# Patient Record
Sex: MALE | Race: BLACK OR AFRICAN AMERICAN | Employment: OTHER | URBAN - METROPOLITAN AREA
[De-identification: names, ages, dates, MRNs, and addresses within clinical notes are randomized per-mention and may not be internally consistent; named-entity substitution may affect disease eponyms.]

---

## 2019-03-22 PROBLEM — R00.2 RAPID PALPITATIONS: Status: ACTIVE | Noted: 2019-03-22

## 2019-03-22 PROBLEM — C85.90 LYMPHOMA (HCC): Status: ACTIVE | Noted: 2019-03-22

## 2019-03-22 PROBLEM — I48.0 PAROXYSMAL A-FIB (HCC): Status: ACTIVE | Noted: 2019-03-22

## 2022-10-26 ENCOUNTER — OFFICE VISIT (OUTPATIENT)
Dept: CARDIOLOGY CLINIC | Age: 72
End: 2022-10-26
Payer: MEDICARE

## 2022-10-26 VITALS
HEART RATE: 62 BPM | WEIGHT: 214 LBS | RESPIRATION RATE: 16 BRPM | SYSTOLIC BLOOD PRESSURE: 140 MMHG | DIASTOLIC BLOOD PRESSURE: 80 MMHG | HEIGHT: 69 IN | BODY MASS INDEX: 31.7 KG/M2

## 2022-10-26 DIAGNOSIS — I38 VALVULAR HEART DISEASE: ICD-10-CM

## 2022-10-26 DIAGNOSIS — I45.10 RBBB (RIGHT BUNDLE BRANCH BLOCK): ICD-10-CM

## 2022-10-26 DIAGNOSIS — I10 PRIMARY HYPERTENSION: ICD-10-CM

## 2022-10-26 DIAGNOSIS — I48.0 PAROXYSMAL ATRIAL FIBRILLATION (HCC): Primary | ICD-10-CM

## 2022-10-26 PROCEDURE — G8427 DOCREV CUR MEDS BY ELIG CLIN: HCPCS | Performed by: INTERNAL MEDICINE

## 2022-10-26 PROCEDURE — 3078F DIAST BP <80 MM HG: CPT | Performed by: INTERNAL MEDICINE

## 2022-10-26 PROCEDURE — 3074F SYST BP LT 130 MM HG: CPT | Performed by: INTERNAL MEDICINE

## 2022-10-26 PROCEDURE — 1123F ACP DISCUSS/DSCN MKR DOCD: CPT | Performed by: INTERNAL MEDICINE

## 2022-10-26 PROCEDURE — 99214 OFFICE O/P EST MOD 30 MIN: CPT | Performed by: INTERNAL MEDICINE

## 2022-10-26 PROCEDURE — 1036F TOBACCO NON-USER: CPT | Performed by: INTERNAL MEDICINE

## 2022-10-26 PROCEDURE — 93000 ELECTROCARDIOGRAM COMPLETE: CPT | Performed by: INTERNAL MEDICINE

## 2022-10-26 PROCEDURE — 3017F COLORECTAL CA SCREEN DOC REV: CPT | Performed by: INTERNAL MEDICINE

## 2022-10-26 PROCEDURE — G8484 FLU IMMUNIZE NO ADMIN: HCPCS | Performed by: INTERNAL MEDICINE

## 2022-10-26 PROCEDURE — G8417 CALC BMI ABV UP PARAM F/U: HCPCS | Performed by: INTERNAL MEDICINE

## 2022-10-26 RX ORDER — GLIPIZIDE 5 MG/1
TABLET ORAL
COMMUNITY
Start: 2022-08-22

## 2022-10-26 NOTE — PROGRESS NOTES
Patient Active Problem List   Diagnosis    Chest pain    RBBB (right bundle branch block)    HTN (hypertension)    OA (osteoarthritis)    GERD (gastroesophageal reflux disease)    Valvular heart disease    Palpitations    Paroxysmal atrial fibrillation (HCC)    Lymphoma (HCC)       Current Outpatient Medications   Medication Sig Dispense Refill    flecainide (TAMBOCOR) 100 MG tablet TAKE 1 TABLET TWICE A  tablet 1    metoprolol succinate (TOPROL XL) 50 MG extended release tablet Take 1 tablet by mouth 2 times daily 180 tablet 3    ELDERBERRY PO Take by mouth daily      pantoprazole (PROTONIX) 40 MG tablet TAKE 1 TABLET BY MOUTH 1/2 HOUR BEFORE BREAKFAST AND SUPPER FOR 3 WEEKS THEN DECREASE TO ONCE A DAY      acetaminophen (TYLENOL) 325 MG tablet Take 650 mg by mouth every 6 hours as needed for Pain      apixaban (ELIQUIS) 5 MG TABS tablet Take 1 tablet by mouth 2 times daily 60 tablet 0    Fish Oil-Cholecalciferol (FISH OIL + D3) 3563-0737 MG-UNIT CAPS Take by mouth LD 12/23/2017      Multiple Vitamins-Minerals (CENTRUM SILVER PO) Take by mouth LD 12/23/2017      glipiZIDE (GLUCOTROL) 5 MG tablet TAKE 1 TABLET BY MOUTH EVERY DAY       No current facility-administered medications for this visit. CC:    Patient is seen in follow up for:  1. Paroxysmal atrial fibrillation (HCC)    2. Primary hypertension    3. Valvular heart disease    4. RBBB (right bundle branch block)        HPI:  Seen in preoperative evaluation prior to hand surgery with Dr. Abiola Cat. Patient is doing well without any specific cardiac problems. Patient denies any shortness of breath, chest pain, lightheadedness or dizziness. Patient is tolerating medications well without side effects.       ROS:   General: No unusual weight gain, no change in exercise tolerance  Skin: No rash or itching  EENT: No vision changes or nosebleeds  Cardiovascular: No orthopnea or paroxysmal nocturnal dyspnea  Respiratory: No cough or hemoptysis  Gastrointestinal: No hematemesis or recent changes in bowel habits  Genitourinary: No hematuria, urgency or frequency  Musculoskeletal: No muscular weakness or joint swelling   Neurologic / Psychiatric: No incoordination or convulsions  Allergic / Immunologic/ Lymphatic / Endocrine: No anemia or bleeding tendency    Social History     Socioeconomic History    Marital status:      Spouse name: Not on file    Number of children: Not on file    Years of education: Not on file    Highest education level: Not on file   Occupational History    Not on file   Tobacco Use    Smoking status: Never    Smokeless tobacco: Never   Vaping Use    Vaping Use: Never used   Substance and Sexual Activity    Alcohol use: No    Drug use: No    Sexual activity: Not on file   Other Topics Concern    Not on file   Social History Narrative    Not on file     Social Determinants of Health     Financial Resource Strain: Not on file   Food Insecurity: Not on file   Transportation Needs: Not on file   Physical Activity: Not on file   Stress: Not on file   Social Connections: Not on file   Intimate Partner Violence: Not on file   Housing Stability: Not on file       Family History   Problem Relation Age of Onset    Heart Disease Mother     Heart Failure Mother     Alzheimer's Disease Father        Past Medical History:   Diagnosis Date    Arthritis     SHOULDERS,let thumb    Cancer ([facility] [address]     prostate 12/2016    GERD (gastroesophageal reflux disease)     Hypertension     STABLE    Left carpal tunnel syndrome     Lymphoma ([facility] [address]     Seasonal allergies     Skin lesions, generalized MULTIPLE    ARMS, BACK, KNEE, healed, presently no open wounds 5/4/17       PHYSICAL EXAM:  CONSTITUTIONAL:  Well developed, well nourished    Vitals:    10/26/22 0832   BP: (!) 140/80   Pulse: 62   Resp: 16   Weight: 214 lb (97.1 kg)   Height: 5' 9\" (1.753 m)     HEAD & FACE: Normocephalic. Symmetric. EYES: No xanthelasma.   Conjunctivae not injected. EARS, NOSE, MOUTH & THROAT: Good dentition. No oral pallor or cyanosis. NECK: No JVD at 30 degrees. No thyromegaly. RESPIRATORY: Clear to auscultation and percussion in all fields. No use of accessory muscle or intercostal retractions. CARDIOVASCULAR: Regular rate and rhythm. No lifts or thrills on palpitation. Auscultation with normal S1-S2 in intensity and splitting. No carotid bruits. Abdominal aorta not enlarged. Femoral arteries without bruits. Pedal pulses 2+. No edema. ABDOMEN: Soft without hepatic or splenic enlargement. No tenderness. MUSCULOSKELETAL: No kyphosis or scoliosis of the back. Good muscle strength and tone. No muscle atrophy. Normal gait and ability to undergo exercise stress testing. EXTREMITIES: No clubbing or cyanosis. SKIN: No Xanthomas or ulcerations. NEUROLOGIC: Oriented to time, place and person. Normal mood and affect. LYMPHATIC:  No palpable neck or supraclavicular nodes. No splenomegaly. EKG: the EKG tracing was reviewed and found to reveal: Normal sinus rhythm. First-degree AV block. Right bundle branch block. QTc 441 msec. No change compared to prior tracing. ASSESSMENT:                                                     ORDERS:       Diagnosis Orders   1. Paroxysmal atrial fibrillation (HCC)  EKG 12 lead      2. Primary hypertension  EKG 12 lead      3. Valvular heart disease  EKG 12 lead      4. RBBB (right bundle branch block)  EKG 12 lead        Above assessment cardiac issues stable. PLAN:   See above orders. Medication reconciliation completed. Old records were reviewed and found to reveal: EF 53%. Discussed issues that would prompt earlier evaluation. Same cardiac medications. Letter of preop risk evaluation dictated to Dr. Renetta Freeman. Follow-up office visit in 2 years.

## 2022-12-19 ENCOUNTER — TELEPHONE (OUTPATIENT)
Dept: NON INVASIVE DIAGNOSTICS | Age: 72
End: 2022-12-19

## 2022-12-19 NOTE — TELEPHONE ENCOUNTER
Called and spoke to patient, he verbalizes that he feels fine other than having some sinus/cold symptoms currently. Discussed with him his most recent device transmission. States he was in the shower on 12/01/2022 around 1100 am, and felt dizzy for a brief period of time. Will forward to Dr Robert Urias.

## 2022-12-19 NOTE — TELEPHONE ENCOUNTER
Called patient with Dr Kamara Mems recommendations, decrease Toprol to 50 mg daily, advise no driving, and recommend pacemaker. Patient states he is at the bank, will call back.

## 2022-12-19 NOTE — TELEPHONE ENCOUNTER
----- Message from Sri Yeung MD sent at 12/19/2022  2:08 PM EST -----  Advised no driving. Advised to decrease Toprol XL from 50 mg bid to 50 mg daily. Please schedule for pacemaker implantation and ILR removal. Thanks.  ----- Message -----  From: Jay Cadena RN  Sent: 12/19/2022   1:29 PM EST  To: Sri Yeung MD    Please see Murj report from 12/19/2022:    Manual transmission received  2 episodes binned as Pause detected  01-Dec-2022 at 11:22, duration 11s  29-Nov-2022 at 06:42, duration 10s  EKGs are c/w SR w/ CHB, no ventricular escape    Verbalizes feeling dizzy on 12/01/2022 at that time while in the shower. States no symptoms since then.

## 2022-12-20 ENCOUNTER — TELEPHONE (OUTPATIENT)
Dept: NON INVASIVE DIAGNOSTICS | Age: 72
End: 2022-12-20

## 2022-12-20 NOTE — TELEPHONE ENCOUNTER
----- Message from Jose Saldaña MD sent at 12/19/2022  2:08 PM EST -----  Advised no driving. Advised to decrease Toprol XL from 50 mg bid to 50 mg daily. Please schedule for pacemaker implantation and ILR removal. Thanks.  ----- Message -----  From: Jerry Beltran RN  Sent: 12/19/2022   1:29 PM EST  To: Jose Saldaña MD    Please see Murj report from 12/19/2022:    Manual transmission received  2 episodes binned as Pause detected  01-Dec-2022 at 11:22, duration 11s  29-Nov-2022 at 06:42, duration 10s  EKGs are c/w SR w/ CHB, no ventricular escape    Verbalizes feeling dizzy on 12/01/2022 at that time while in the shower. States no symptoms since then.

## 2022-12-20 NOTE — TELEPHONE ENCOUNTER
Patient called back at  after he returned from the bank. Explained the findings on the ILR. Informed him of Dr Hoda Carmona recommendations. Call forwarded to schedulers.

## 2022-12-21 NOTE — TELEPHONE ENCOUNTER
I spoke to patient and offered to schedule his ppm implant, he wants to discuss with work first and then call me back to set up a date in January.

## 2023-01-04 ENCOUNTER — ANESTHESIA (OUTPATIENT)
Dept: CARDIAC CATH/INVASIVE PROCEDURES | Age: 73
End: 2023-01-04

## 2023-01-04 ENCOUNTER — HOSPITAL ENCOUNTER (OUTPATIENT)
Dept: CARDIAC CATH/INVASIVE PROCEDURES | Age: 73
Setting detail: OBSERVATION
Discharge: HOME | End: 2023-01-05
Attending: INTERNAL MEDICINE | Admitting: INTERNAL MEDICINE
Payer: MEDICARE

## 2023-01-04 ENCOUNTER — HOSPITAL ENCOUNTER (OUTPATIENT)
Dept: GENERAL RADIOLOGY | Age: 73
Discharge: HOME | End: 2023-01-06
Attending: INTERNAL MEDICINE
Payer: MEDICARE

## 2023-01-04 ENCOUNTER — ANESTHESIA EVENT (OUTPATIENT)
Dept: CARDIAC CATH/INVASIVE PROCEDURES | Age: 73
End: 2023-01-04

## 2023-01-04 PROBLEM — I45.9 HB (HEART BLOCK): Status: ACTIVE | Noted: 2023-01-04

## 2023-01-04 PROBLEM — I44.2 COMPLETE AV BLOCK (HCC): Status: ACTIVE | Noted: 2023-01-04

## 2023-01-04 LAB
ANION GAP SERPL CALCULATED.3IONS-SCNC: 14 MMOL/L (ref 7–16)
BASOPHILS ABSOLUTE: 0.05 E9/L (ref 0–0.2)
BASOPHILS RELATIVE PERCENT: 0.5 % (ref 0–2)
BUN BLDV-MCNC: 17 MG/DL (ref 6–23)
CALCIUM SERPL-MCNC: 9.8 MG/DL (ref 8.6–10.2)
CHLORIDE BLD-SCNC: 107 MMOL/L (ref 98–107)
CO2: 22 MMOL/L (ref 22–29)
CREAT SERPL-MCNC: 0.9 MG/DL (ref 0.7–1.2)
EKG ATRIAL RATE: 60 BPM
EKG P AXIS: 42 DEGREES
EKG P-R INTERVAL: 342 MS
EKG Q-T INTERVAL: 454 MS
EKG QRS DURATION: 186 MS
EKG QTC CALCULATION (BAZETT): 454 MS
EKG R AXIS: -50 DEGREES
EKG T AXIS: -39 DEGREES
EKG VENTRICULAR RATE: 60 BPM
EOSINOPHILS ABSOLUTE: 0.15 E9/L (ref 0.05–0.5)
EOSINOPHILS RELATIVE PERCENT: 1.6 % (ref 0–6)
GFR SERPL CREATININE-BSD FRML MDRD: >60 ML/MIN/1.73
GLUCOSE BLD-MCNC: 150 MG/DL (ref 74–99)
HCT VFR BLD CALC: 37.8 % (ref 37–54)
HEMOGLOBIN: 13.3 G/DL (ref 12.5–16.5)
IMMATURE GRANULOCYTES #: 0.02 E9/L
IMMATURE GRANULOCYTES %: 0.2 % (ref 0–5)
LV EF: 58 %
LVEF MODALITY: NORMAL
LYMPHOCYTES ABSOLUTE: 1.71 E9/L (ref 1.5–4)
LYMPHOCYTES RELATIVE PERCENT: 18.7 % (ref 20–42)
MAGNESIUM: 1.9 MG/DL (ref 1.6–2.6)
MCH RBC QN AUTO: 31.4 PG (ref 26–35)
MCHC RBC AUTO-ENTMCNC: 35.2 % (ref 32–34.5)
MCV RBC AUTO: 89.4 FL (ref 80–99.9)
METER GLUCOSE: 97 MG/DL (ref 74–99)
MONOCYTES ABSOLUTE: 1.03 E9/L (ref 0.1–0.95)
MONOCYTES RELATIVE PERCENT: 11.3 % (ref 2–12)
NEUTROPHILS ABSOLUTE: 6.19 E9/L (ref 1.8–7.3)
NEUTROPHILS RELATIVE PERCENT: 67.7 % (ref 43–80)
PDW BLD-RTO: 11.5 FL (ref 11.5–15)
PLATELET # BLD: 323 E9/L (ref 130–450)
PMV BLD AUTO: 9.7 FL (ref 7–12)
POTASSIUM SERPL-SCNC: 4.2 MMOL/L (ref 3.5–5)
RBC # BLD: 4.23 E12/L (ref 3.8–5.8)
SODIUM BLD-SCNC: 143 MMOL/L (ref 132–146)
WBC # BLD: 9.2 E9/L (ref 4.5–11.5)

## 2023-01-04 PROCEDURE — 93308 TTE F-UP OR LMTD: CPT

## 2023-01-04 PROCEDURE — G0378 HOSPITAL OBSERVATION PER HR: HCPCS

## 2023-01-04 PROCEDURE — C1889 IMPLANT/INSERT DEVICE, NOC: HCPCS

## 2023-01-04 PROCEDURE — 6360000002 HC RX W HCPCS

## 2023-01-04 PROCEDURE — 2500000003 HC RX 250 WO HCPCS

## 2023-01-04 PROCEDURE — 93005 ELECTROCARDIOGRAM TRACING: CPT | Performed by: INTERNAL MEDICINE

## 2023-01-04 PROCEDURE — 2580000003 HC RX 258: Performed by: INTERNAL MEDICINE

## 2023-01-04 PROCEDURE — 33286 RMVL SUBQ CAR RHYTHM MNTR: CPT

## 2023-01-04 PROCEDURE — 2580000003 HC RX 258: Performed by: NURSE ANESTHETIST, CERTIFIED REGISTERED

## 2023-01-04 PROCEDURE — C1769 GUIDE WIRE: HCPCS

## 2023-01-04 PROCEDURE — 71045 X-RAY EXAM CHEST 1 VIEW: CPT

## 2023-01-04 PROCEDURE — 2709999900 HC NON-CHARGEABLE SUPPLY

## 2023-01-04 PROCEDURE — 3700000000 HC ANESTHESIA ATTENDED CARE

## 2023-01-04 PROCEDURE — 6360000002 HC RX W HCPCS: Performed by: NURSE ANESTHETIST, CERTIFIED REGISTERED

## 2023-01-04 PROCEDURE — 99223 1ST HOSP IP/OBS HIGH 75: CPT | Performed by: INTERNAL MEDICINE

## 2023-01-04 PROCEDURE — 93010 ELECTROCARDIOGRAM REPORT: CPT | Performed by: INTERNAL MEDICINE

## 2023-01-04 PROCEDURE — C1894 INTRO/SHEATH, NON-LASER: HCPCS

## 2023-01-04 PROCEDURE — C1785 PMKR, DUAL, RATE-RESP: HCPCS

## 2023-01-04 PROCEDURE — 6370000000 HC RX 637 (ALT 250 FOR IP): Performed by: INTERNAL MEDICINE

## 2023-01-04 PROCEDURE — 85025 COMPLETE CBC W/AUTO DIFF WBC: CPT

## 2023-01-04 PROCEDURE — 33208 INSRT HEART PM ATRIAL & VENT: CPT | Performed by: INTERNAL MEDICINE

## 2023-01-04 PROCEDURE — 6360000004 HC RX CONTRAST MEDICATION: Performed by: INTERNAL MEDICINE

## 2023-01-04 PROCEDURE — 3700000001 HC ADD 15 MINUTES (ANESTHESIA)

## 2023-01-04 PROCEDURE — 33208 INSRT HEART PM ATRIAL & VENT: CPT

## 2023-01-04 PROCEDURE — G0379 DIRECT REFER HOSPITAL OBSERV: HCPCS

## 2023-01-04 PROCEDURE — 33286 RMVL SUBQ CAR RHYTHM MNTR: CPT | Performed by: INTERNAL MEDICINE

## 2023-01-04 PROCEDURE — C1898 LEAD, PMKR, OTHER THAN TRANS: HCPCS

## 2023-01-04 PROCEDURE — 2580000003 HC RX 258

## 2023-01-04 PROCEDURE — 83735 ASSAY OF MAGNESIUM: CPT

## 2023-01-04 PROCEDURE — 36415 COLL VENOUS BLD VENIPUNCTURE: CPT

## 2023-01-04 PROCEDURE — 82962 GLUCOSE BLOOD TEST: CPT

## 2023-01-04 PROCEDURE — 80048 BASIC METABOLIC PNL TOTAL CA: CPT

## 2023-01-04 RX ORDER — ACETAMINOPHEN 325 MG/1
650 TABLET ORAL EVERY 6 HOURS PRN
Status: DISCONTINUED | OUTPATIENT
Start: 2023-01-04 | End: 2023-01-05 | Stop reason: HOSPADM

## 2023-01-04 RX ORDER — METOPROLOL SUCCINATE 50 MG/1
50 TABLET, EXTENDED RELEASE ORAL DAILY
Status: DISCONTINUED | OUTPATIENT
Start: 2023-01-04 | End: 2023-01-05 | Stop reason: HOSPADM

## 2023-01-04 RX ORDER — VANCOMYCIN HYDROCHLORIDE 1 G/20ML
INJECTION, POWDER, LYOPHILIZED, FOR SOLUTION INTRAVENOUS PRN
Status: DISCONTINUED | OUTPATIENT
Start: 2023-01-04 | End: 2023-01-04 | Stop reason: SDUPTHER

## 2023-01-04 RX ORDER — SODIUM CHLORIDE 9 MG/ML
INJECTION, SOLUTION INTRAVENOUS PRN
Status: DISCONTINUED | OUTPATIENT
Start: 2023-01-04 | End: 2023-01-05 | Stop reason: HOSPADM

## 2023-01-04 RX ORDER — SODIUM CHLORIDE 9 MG/ML
INJECTION, SOLUTION INTRAVENOUS ONCE
Status: COMPLETED | OUTPATIENT
Start: 2023-01-04 | End: 2023-01-04

## 2023-01-04 RX ORDER — PROPOFOL 10 MG/ML
INJECTION, EMULSION INTRAVENOUS CONTINUOUS PRN
Status: DISCONTINUED | OUTPATIENT
Start: 2023-01-04 | End: 2023-01-04 | Stop reason: SDUPTHER

## 2023-01-04 RX ORDER — FLECAINIDE ACETATE 100 MG/1
100 TABLET ORAL 2 TIMES DAILY
Status: DISCONTINUED | OUTPATIENT
Start: 2023-01-04 | End: 2023-01-05 | Stop reason: HOSPADM

## 2023-01-04 RX ORDER — SODIUM CHLORIDE 0.9 % (FLUSH) 0.9 %
5-40 SYRINGE (ML) INJECTION EVERY 12 HOURS SCHEDULED
Status: DISCONTINUED | OUTPATIENT
Start: 2023-01-04 | End: 2023-01-05 | Stop reason: HOSPADM

## 2023-01-04 RX ORDER — FENTANYL CITRATE 50 UG/ML
INJECTION, SOLUTION INTRAMUSCULAR; INTRAVENOUS PRN
Status: DISCONTINUED | OUTPATIENT
Start: 2023-01-04 | End: 2023-01-04 | Stop reason: SDUPTHER

## 2023-01-04 RX ORDER — DEXTROSE MONOHYDRATE 50 MG/ML
INJECTION, SOLUTION INTRAVENOUS CONTINUOUS PRN
Status: DISCONTINUED | OUTPATIENT
Start: 2023-01-04 | End: 2023-01-04 | Stop reason: SDUPTHER

## 2023-01-04 RX ORDER — PANTOPRAZOLE SODIUM 40 MG/1
40 TABLET, DELAYED RELEASE ORAL
Status: DISCONTINUED | OUTPATIENT
Start: 2023-01-05 | End: 2023-01-05 | Stop reason: HOSPADM

## 2023-01-04 RX ORDER — ONDANSETRON 2 MG/ML
4 INJECTION INTRAMUSCULAR; INTRAVENOUS EVERY 6 HOURS PRN
Status: DISCONTINUED | OUTPATIENT
Start: 2023-01-04 | End: 2023-01-05 | Stop reason: HOSPADM

## 2023-01-04 RX ORDER — DEXTROSE MONOHYDRATE 100 MG/ML
INJECTION, SOLUTION INTRAVENOUS CONTINUOUS PRN
Status: DISCONTINUED | OUTPATIENT
Start: 2023-01-04 | End: 2023-01-05 | Stop reason: HOSPADM

## 2023-01-04 RX ORDER — SODIUM CHLORIDE 0.9 % (FLUSH) 0.9 %
5-40 SYRINGE (ML) INJECTION PRN
Status: DISCONTINUED | OUTPATIENT
Start: 2023-01-04 | End: 2023-01-05 | Stop reason: HOSPADM

## 2023-01-04 RX ORDER — KETOROLAC TROMETHAMINE 30 MG/ML
INJECTION, SOLUTION INTRAMUSCULAR; INTRAVENOUS PRN
Status: DISCONTINUED | OUTPATIENT
Start: 2023-01-04 | End: 2023-01-04 | Stop reason: SDUPTHER

## 2023-01-04 RX ORDER — MIDAZOLAM HYDROCHLORIDE 1 MG/ML
INJECTION INTRAMUSCULAR; INTRAVENOUS PRN
Status: DISCONTINUED | OUTPATIENT
Start: 2023-01-04 | End: 2023-01-04 | Stop reason: SDUPTHER

## 2023-01-04 RX ORDER — GLIPIZIDE 5 MG/1
5 TABLET ORAL DAILY
Status: DISCONTINUED | OUTPATIENT
Start: 2023-01-04 | End: 2023-01-05 | Stop reason: HOSPADM

## 2023-01-04 RX ADMIN — PROPOFOL 50 MCG/KG/MIN: 10 INJECTION, EMULSION INTRAVENOUS at 11:30

## 2023-01-04 RX ADMIN — DEXTROSE MONOHYDRATE: 50 INJECTION, SOLUTION INTRAVENOUS at 11:35

## 2023-01-04 RX ADMIN — METOPROLOL SUCCINATE 50 MG: 50 TABLET, EXTENDED RELEASE ORAL at 16:10

## 2023-01-04 RX ADMIN — FLECAINIDE ACETATE 100 MG: 100 TABLET ORAL at 22:16

## 2023-01-04 RX ADMIN — FENTANYL CITRATE 50 MCG: 50 INJECTION, SOLUTION INTRAMUSCULAR; INTRAVENOUS at 11:35

## 2023-01-04 RX ADMIN — MIDAZOLAM 2 MG: 1 INJECTION INTRAMUSCULAR; INTRAVENOUS at 11:25

## 2023-01-04 RX ADMIN — FENTANYL CITRATE 50 MCG: 50 INJECTION, SOLUTION INTRAMUSCULAR; INTRAVENOUS at 11:30

## 2023-01-04 RX ADMIN — SODIUM CHLORIDE: 9 INJECTION, SOLUTION INTRAVENOUS at 11:15

## 2023-01-04 RX ADMIN — KETOROLAC TROMETHAMINE 30 MG: 30 INJECTION, SOLUTION INTRAMUSCULAR; INTRAVENOUS at 13:05

## 2023-01-04 RX ADMIN — PROPOFOL 30 MG: 10 INJECTION, EMULSION INTRAVENOUS at 11:29

## 2023-01-04 RX ADMIN — SODIUM CHLORIDE, PRESERVATIVE FREE 10 ML: 5 INJECTION INTRAVENOUS at 22:17

## 2023-01-04 RX ADMIN — VANCOMYCIN HYDROCHLORIDE 1000 MG: 1 INJECTION, POWDER, LYOPHILIZED, FOR SOLUTION INTRAVENOUS at 11:35

## 2023-01-04 RX ADMIN — PERFLUTREN 1.5 ML: 6.52 INJECTION, SUSPENSION INTRAVENOUS at 11:00

## 2023-01-04 ASSESSMENT — LIFESTYLE VARIABLES: SMOKING_STATUS: 0

## 2023-01-04 NOTE — PLAN OF CARE
Problem: Discharge Planning  Goal: Discharge to home or other facility with appropriate resources  Outcome: Progressing  Flowsheets  Taken 1/4/2023 1600  Discharge to home or other facility with appropriate resources:   Identify barriers to discharge with patient and caregiver   Arrange for needed discharge resources and transportation as appropriate  Taken 1/4/2023 1345  Discharge to home or other facility with appropriate resources:   Identify barriers to discharge with patient and caregiver   Arrange for needed discharge resources and transportation as appropriate     Problem: Chronic Conditions and Co-morbidities  Goal: Patient's chronic conditions and co-morbidity symptoms are monitored and maintained or improved  Outcome: Progressing  Flowsheets  Taken 1/4/2023 1600  Care Plan - Patient's Chronic Conditions and Co-Morbidity Symptoms are Monitored and Maintained or Improved:   Monitor and assess patient's chronic conditions and comorbid symptoms for stability, deterioration, or improvement   Collaborate with multidisciplinary team to address chronic and comorbid conditions and prevent exacerbation or deterioration  Taken 1/4/2023 1345  Care Plan - Patient's Chronic Conditions and Co-Morbidity Symptoms are Monitored and Maintained or Improved:   Monitor and assess patient's chronic conditions and comorbid symptoms for stability, deterioration, or improvement   Collaborate with multidisciplinary team to address chronic and comorbid conditions and prevent exacerbation or deterioration     Problem: Safety - Adult  Goal: Free from fall injury  Outcome: Progressing     Problem: ABCDS Injury Assessment  Goal: Absence of physical injury  Outcome: Progressing

## 2023-01-04 NOTE — OP NOTE
[address] [facility] Electrophysiology  Procedure Report  PATIENT: Hyacinth Sadler Sr  MEDICAL RECORD NUMBER: [id number]  DATE OF PROCEDURE:  1/4/2023  ATTENDING ELECTROPHYSIOLOGIST:George Hirsch MD  REFERRING PHYSICIAN: Dr. Mounika Stout    Procedure Performed:  1. Insertion of Dual Chamber Permanent Pacemaker (Medtronic- MRI conditional)  2. LINQ Insertable Cardiac Monitor Explantation   3. Left upper extremity venography  4. Fluoroscopy    Indication for Procedure:  1. Intermittent complete AV block  2. Chronic bifascicular block  3. Paroxysmal atrial tachycardia and atrial fibrillation  4. ILR in situ    Flouroscopy: 94.7 min  Complications: none immediately apparent  EBL: minimal  Specimens: none  Contrast: 10 cc    FINDINGS:  Implanted device information:  1. Pulse Generator is a Medtronic. Serial # [id number]  Placement: Left pectoral subcutaneous  Date of implant: 1/4/2023  2. Right atrial lead parameters are as follows:  Medtronic Model # [id number]. Serial # [id number]  Lead position: RA  Date of implant: 1/4/2023  P-waves: 4.8 mV  Pacing threshold: 0.5 V at 0.4 ms. Impedance: 762 ohms. 3. Right ventricular lead parameters are as follows:  Medtronic Model [id number]. Serial # [id number]  Lead position: RV  Date of implant: 1/4/2023  R-waves: 4.9 mV  Pacing threshold: 0.75 V at 0.4 ms. Impedance: 722 ohms. 4. Bradycardia parameters:  Mode: AAIR <-> DDDR  Base Rate: 60  AV delay: 300/270  Max Tracking Rate: 130    DETAILS OF THE OPERATION: The risks, benefits, alternatives of the procedure were explained to patient and family. They consented and agreed to proceed. Written consent was obtained in the chart. Blood products are not routinely needed for such procedures. The patient was brought to the Electrophysiology lab in a fasting and non-sedated state. The patient had electrocardiographic and hemodynamic monitoring equipment placed.  During the case the patient was under the care of an anesthesiologist/CRNA team for sedation and hemodynamic monitoring. A final time out was performed prior to beginning the procedure. The patient was prepped and draped in usual sterile fashion. The left subclavian venogram was performed and showed patent subclavian vein. Twenty mL of 2% lidocaine was used to anesthetize the left pectoral area. Using a #10 blade a 4-cm incision was made below the left clavicle. Using combination of manual dissection and electrocautery, the pacemaker pocket was created to approximate the size of the patient's device. Hemostasis was achieved with electrocautery and confirmed visually. Using a modified Seldinger technique and a fluroscopic guidance, one Terumo glide wire and one short guide wire were placed in the left subclavian vein due to tortuosity of the vein. Over one of the short J-tipped guide wire, a 7-Fr Safe-sheath was passed. Through this, the right ventricular lead was advanced without difficulty to the right ventricular apical septum using a combination of straight and curved stylets and under fluoroscopic guidance. Mapping of the lead was performed and the lead parameters were deemed to be adequate. The lead was then actively fixated in place. The RV l;ead was repositioned once due to low sensing amplitudes and high pacing thresholds except final position. The 7-Fr Safe-sheath was then removed. The lead was then sewn to the pre-pectoral fascia using 0 Ethibond sutures, suturing over the suture sleeve. Over the retained guide-wire an additional 7-Yoruba sheath was passed. Through this, the right atrial lead was advanced without difficulty to the right atrial appendage using a combination of straight and curved stylets and under fluoroscopic guidance. Mapping of the lead was performed. Lead parameters were deemed to be adequate and lead was then actively fixated in place. The 7-Fr Safe- sheath was then removed.  The lead was then sewn to the pre-pectoral fascia using 0 Ethibond sutures, suturing over the suture sleeve. The device pocket was then irrigated with antibiotic solution. The leads were then attached to the appropriate binding posts on the header of the device. The set screws were then tightened with a torque wrench. Tug tests were performed on both leads to insure they are adequately fixated. The device and the leads were then placed within the Tyrx antibiotic pouch and were place within the newly formed device pocket. Lead parameters were then rechecked via the device and deemed to be adequate. The pulse generator was also tied to the pre-pectoral fascia using 0-Ethibond. Surgiflo was injected into the pocket to ensure hemostasis. The incision was closed with 3 layers of absorbable suture, Vicryl. The deepest of which was a 2-0 interrupted stitch followed by a 2nd layer of 3-0 running stitch performed perpendicular to the deep layer and, lastly, a 4-0 subcuticular stitch. The skin was then prepped with benzoin solution, Steri-Strips, and island dressing were then applied. After this the attention was directed to the previous ILR site. The region of the prior placed ICM, was liberally infiltrated with 15 ml of 2% Lidocaine. Using the surgical blade, a small incision was made at the old surgical scar of the pocket. Using blunt dissection , electrocautery and manual manipulation, the device was successfully removed out of the pocket. Hemostasis was achieved with manual pressure. The pocket was flushed with antibiotic solution. The incision was closed in 2 layers including subcutaneous and subcuticular tissues using 3-0 and 4-0 Vicryl, respectively. The wound was dressed with steri-strips and an op site dressing. At the end of the case, the patient was hemodynamically stable and under the care of the anesthesiologist, the patient was brought back to the recovery area for post procedural monitoring. ASSESSMENT:  1.  Successful implantation of a Medtronic dual chamber permanent pacemaker. 2. Successful explantation of a LINQ insertable cardiac monitor. RECOMMENDATIONS:  1. Stat portable chest x-ray to rule out pneumothorax and check lead placement now and tomorrow morning. 2. EKG to be performed now. 3. Post-procedural monitoring in the recovery area. 4. The patient will be observed overnight on Telemetry. 5. The patient will receive 24-hours of yanci-operative intravenous antibiotics. 6. The patient's device will be interrogated in the morning by a representative of the device . 7. The patient is to follow-up in device clinic within 2 weeks upon discharge. 8. The patient is advised follow all post-operative device and wound care instructions as per the information provided in the pre-operative clinic.     Ba Frye MD  Cardiac Electrophysiology  [address]   [facility]  1:16 PM  1/4/2023

## 2023-01-04 NOTE — ANESTHESIA PRE PROCEDURE
Department of Anesthesiology  Preprocedure Note       Name:  Bakari Hirsch   Age:  71 y.o.  :  1950                                          MRN:  [id number]         Date:  2023      Surgeon: Marshall Pena    Procedure: venogram, implant PPM    Medications prior to admission:   Prior to Admission medications    Medication Sig Start Date End Date Taking?  Authorizing Provider   glipiZIDE (GLUCOTROL) 5 MG tablet TAKE 1 TABLET BY MOUTH EVERY DAY 22   Historical Provider, MD   flecainide (TAMBOCOR) 100 MG tablet TAKE 1 TABLET TWICE A DAY 22   Kenrick Zhu MD   metoprolol succinate (TOPROL XL) 50 MG extended release tablet Take 1 tablet by mouth 2 times daily  Patient taking differently: Take 50 mg by mouth daily 10/6/21   Kenrick Zhu MD   ELDERBERRY PO Take by mouth daily    Historical Provider, MD   pantoprazole (PROTONIX) 40 MG tablet TAKE 1 TABLET BY MOUTH 1/2 HOUR BEFORE BREAKFAST AND SUPPER FOR 3 WEEKS THEN DECREASE TO ONCE A DAY 6/3/20   Historical Provider, MD   acetaminophen (TYLENOL) 325 MG tablet Take 650 mg by mouth every 6 hours as needed for Pain    Historical Provider, MD   apixaban (ELIQUIS) 5 MG TABS tablet Take 1 tablet by mouth 2 times daily 3/24/19   Aura Cash PA-C   Fish Oil-Cholecalciferol (FISH OIL + D3) 4074-1756 MG-UNIT CAPS Take by mouth LD 2017    Historical Provider, MD   Multiple Vitamins-Minerals (CENTRUM SILVER PO) Take by mouth LD 2017    Historical Provider, MD       Current medications:    Current Outpatient Medications   Medication Sig Dispense Refill    glipiZIDE (GLUCOTROL) 5 MG tablet TAKE 1 TABLET BY MOUTH EVERY DAY      flecainide (TAMBOCOR) 100 MG tablet TAKE 1 TABLET TWICE A  tablet 1    metoprolol succinate (TOPROL XL) 50 MG extended release tablet Take 1 tablet by mouth 2 times daily (Patient taking differently: Take 50 mg by mouth daily) 180 tablet 3    ELDERBERRY PO Take by mouth daily      pantoprazole (PROTONIX) 40 MG tablet TAKE 1 TABLET BY MOUTH 1/2 HOUR BEFORE BREAKFAST AND SUPPER FOR 3 WEEKS THEN DECREASE TO ONCE A DAY      acetaminophen (TYLENOL) 325 MG tablet Take 650 mg by mouth every 6 hours as needed for Pain      apixaban (ELIQUIS) 5 MG TABS tablet Take 1 tablet by mouth 2 times daily 60 tablet 0    Fish Oil-Cholecalciferol (FISH OIL + D3) 6910-0355 MG-UNIT CAPS Take by mouth LD 12/23/2017      Multiple Vitamins-Minerals (CENTRUM SILVER PO) Take by mouth LD 12/23/2017       Current Facility-Administered Medications   Medication Dose Route Frequency Provider Last Rate Last Admin    0.9 % sodium chloride infusion   IntraVENous Once Anthony Pruett MD        0.9 % sodium chloride infusion   IntraVENous Once Anthony Pruett MD           Allergies:     Allergies   Allergen Reactions    Pcn [Penicillins] Other (See Comments)     FEELS COLD    Percocet [Oxycodone-Acetaminophen] Other (See Comments)     Makes him feel \"off\"/ Patient states no allergy to this medication       Problem List:    Patient Active Problem List   Diagnosis Code    Chest pain R07.9    RBBB (right bundle branch block) I45.10    HTN (hypertension) I10    OA (osteoarthritis) M19.90    GERD (gastroesophageal reflux disease) K21.9    Valvular heart disease I38    Palpitations R00.2    Paroxysmal atrial fibrillation (HCC) I48.0    Lymphoma ([address] C85.90       Past Medical History:        Diagnosis Date    Arthritis     SHOULDERS,let thumb    Cancer ([address]     prostate 12/2016    Diabetes mellitus ([address]     GERD (gastroesophageal reflux disease)     Hypertension     STABLE    Left carpal tunnel syndrome     Lymphoma ([address]     Seasonal allergies     Skin lesions, generalized MULTIPLE    ARMS, BACK, KNEE, healed, presently no open wounds 5/4/17       Past Surgical History:        Procedure Laterality Date    CARPAL TUNNEL RELEASE Right     CARPAL TUNNEL RELEASE Left 12/29/2017    COLONOSCOPY      INSERTABLE CARDIAC MONITOR  11/22/2021    Linq insertion by Dr. Vera Michele  07/19/2013    removal right forearm lesion/ right abdominal lesion/ left chest wall lesion/ left thight lesion    OTHER SURGICAL HISTORY Left 12/29/2017    LEFT THUMB TRAPEZIECTOMY WITH LIGAMENT RECONSTRUCTION       Social History:    Social History     Tobacco Use    Smoking status: Never    Smokeless tobacco: Never   Substance Use Topics    Alcohol use: No                                Counseling given: Not Answered      Vital Signs (Current):   Vitals:    01/04/23 0904 01/04/23 0907   BP:  (!) 152/61   Pulse:  (!) 37   Resp:  22   Temp: 36.6 °C (97.8 °F) 36.6 °C (97.8 °F)   TempSrc: Temporal    SpO2:  97%   Weight: 215 lb (97.5 kg)    Height: 5' 9\" (1.753 m)                                               BP Readings from Last 3 Encounters:   01/04/23 (!) 152/61   10/26/22 (!) 140/80   09/27/21 132/78       NPO Status:  >8 hrs                                                                               BMI:   Wt Readings from Last 3 Encounters:   01/04/23 215 lb (97.5 kg)   10/26/22 214 lb (97.1 kg)   10/17/22 205 lb (93 kg)     Body mass index is 31.75 kg/m².     CBC:   Lab Results   Component Value Date/Time    WBC 9.2 01/04/2023 09:10 AM    RBC 4.23 01/04/2023 09:10 AM    HGB 13.3 01/04/2023 09:10 AM    HCT 37.8 01/04/2023 09:10 AM    MCV 89.4 01/04/2023 09:10 AM    RDW 11.5 01/04/2023 09:10 AM     01/04/2023 09:10 AM       CMP:   Lab Results   Component Value Date/Time     01/04/2023 09:10 AM    K 4.2 01/04/2023 09:10 AM     01/04/2023 09:10 AM    CO2 22 01/04/2023 09:10 AM    BUN 17 01/04/2023 09:10 AM    CREATININE 0.9 01/04/2023 09:10 AM    GFRAA >60 09/27/2021 03:35 PM    LABGLOM >60 01/04/2023 09:10 AM    GLUCOSE 150 01/04/2023 09:10 AM    PROT 7.9 09/27/2021 03:35 PM    CALCIUM 9.8 01/04/2023 09:10 AM    BILITOT 0.7 09/27/2021 03:35 PM    ALKPHOS 78 09/27/2021 03:35 PM    AST 22 09/27/2021 03:35 PM    ALT 8 09/27/2021 03:35 PM       POC Tests: No results for input(s): POCGLU, POCNA, POCK, POCCL, POCBUN, POCHEMO, POCHCT in the last 72 hours. Coags:   Lab Results   Component Value Date/Time    PROTIME 12.5 01/09/2017 07:40 AM    INR 1.2 01/09/2017 07:40 AM    APTT 36.4 01/09/2017 07:40 AM       HCG (If Applicable): No results found for: PREGTESTUR, PREGSERUM, HCG, HCGQUANT     ABGs: No results found for: PHART, PO2ART, CQY7QJT, FLA8DOM, BEART, A9NUJWEO     Type & Screen (If Applicable):  No results found for: LABABO, LABRH    Drug/Infectious Status (If Applicable):  No results found for: HIV, HEPCAB    COVID-19 Screening (If Applicable): No results found for: COVID19        Anesthesia Evaluation  Patient summary reviewed and Nursing notes reviewed no history of anesthetic complications:   Airway: Mallampati: II  TM distance: >3 FB   Neck ROM: full  Mouth opening: > = 3 FB   Dental:          Pulmonary:Negative Pulmonary ROS and normal exam  breath sounds clear to auscultation      (-) not a current smoker                           Cardiovascular:  Exercise tolerance: poor (<4 METS),   (+) hypertension:, dysrhythmias (RBBB): atrial fibrillation,       ECG reviewed  Rhythm: regular  Rate: normal  Echocardiogram reviewed    Cleared by cardiology              Neuro/Psych:   (+) neuromuscular disease:,              ROS comment: Carpal tunnel GI/Hepatic/Renal:   (+) GERD: well controlled,           Endo/Other:    (+) Diabetes, blood dyscrasia: anticoagulation therapy, arthritis: OA., malignancy/cancer (prostate, lymphoma). Pt had no PAT visit       Abdominal:       Abdomen: soft. Vascular: negative vascular ROS. Other Findings:           Anesthesia Plan      MAC     ASA 4       Induction: intravenous. Anesthetic plan and risks discussed with patient. Use of blood products discussed with patient whom. Plan discussed with attending.                     MAYNOR Keys - CRNA   1/4/2023

## 2023-01-04 NOTE — H&P
[address] and Vascular [address] [facility] Electrophysiology  History and Physical Examination  PATIENT: Aurora Brody Sr  MEDICAL RECORD NUMBER: [id number]  DATE OF SERVICE:  1/4/2023  ATTENDING ELECTROPHYSIOLOGIST: Ba Frye MD  REFERRING PHYSICIAN: Loretto Nyhan, DO  CHIEF COMPLAINT: Paroxysmal atrial tachycardia with reported history of paroxysmal atrial fibrillation    HPI: This is a 71 y.o. male with a history of   Patient Active Problem List   Diagnosis    Chest pain    RBBB (right bundle branch block)    HTN (hypertension)    OA (osteoarthritis)    GERD (gastroesophageal reflux disease)    Valvular heart disease    Palpitations    Paroxysmal atrial fibrillation (HCC)    Lymphoma ([facility]    HB (heart block)    Complete AV block ([facility]   who presents to hospital for elective implantable loop recorder insertion. The patient has history of PAR and reported history of PAF on 3/23/20 by ED physician. No EKG or strip during PAF available for review except EKG that showed runs of paroxysmal atrial tachycardia on 6/11/20. Risks, benefits and alternatives of ILR insertion were discussed. He verbalizes understanding and agrees to proceed with the procedure. 1/4/22: The patient underwent ILR insertion on 11/22/21. He was found to have symptomatic intermittent complete AV block on 11/29/22 and 12/1 22 with asystole at 11  and 10 seconds respectively. He reports dizziness during episodes. During history of PAF required medi valeria therapy, chronic bifascicular block and intermittent complete AV block, dual chamber pacemaker implantation and ILR extraction were advised. Risks, benefits, and alternatives of permanent pacemaker implantation and ILR explantation were discussed in detail today.  These risks include but are not limited to bleeding, infection, blood clot, pneumothorax, hemothorax,cardiac valve damage, cardiac perforation and tamponade required emergent thoracotomy, contrast induced nephropathy leading to short or even long term dialysis, vascular injury requiring emergent surgical repair, lead dislodgement, stroke and even death. The patient understands these risks and agrees to proceed with pacemaker implantation and ILR explantation.   .   Patient Active Problem List    Diagnosis Date Noted    HB (heart block) 01/04/2023     Priority: Medium    Complete AV block ([facility] 01/04/2023     Priority: Medium    Palpitations 03/22/2019    Paroxysmal atrial fibrillation ([facility] 03/22/2019    Lymphoma ([facility] 03/22/2019    HTN (hypertension) 07/14/2014    OA (osteoarthritis) 07/14/2014    GERD (gastroesophageal reflux disease) 07/14/2014    Valvular heart disease 07/14/2014    Chest pain 01/18/2012    RBBB (right bundle branch block) 01/18/2012       Past Medical History:   Diagnosis Date    Arthritis     SHOULDERS,let thumb    Cancer ([facility]     prostate 12/2016    Diabetes mellitus (HCC)     GERD (gastroesophageal reflux disease)     Hypertension     STABLE    Left carpal tunnel syndrome     Lymphoma (HCC)     Seasonal allergies     Skin lesions, generalized MULTIPLE    ARMS, BACK, KNEE, healed, presently no open wounds 5/4/17       Family History   Problem Relation Age of Onset    Heart Disease Mother     Heart Failure Mother     Alzheimer's Disease Father        Social History     Tobacco Use    Smoking status: Never    Smokeless tobacco: Never   Substance Use Topics    Alcohol use: No       Current Facility-Administered Medications   Medication Dose Route Frequency Provider Last Rate Last Admin    acetaminophen (TYLENOL) tablet 650 mg  650 mg Oral Q6H PRN Amando Mancilla MD        [START ON 1/5/2023] pantoprazole (PROTONIX) tablet 40 mg  40 mg Oral QAM AC George Carias MD        metoprolol succinate (TOPROL XL) extended release tablet 50 mg  50 mg Oral Daily George Carias MD        flecainide (TAMBOCOR) tablet 100 mg  100 mg Oral BID Amando Mancilla MD        glipiZIDE (GLUCOTROL) tablet 5 mg  1 tablet Oral Daily Sofiya Johnson MD        sodium chloride flush 0.9 % injection 5-40 mL  5-40 mL IntraVENous 2 times per day Sofiya Johnson MD        sodium chloride flush 0.9 % injection 5-40 mL  5-40 mL IntraVENous PRN Sofiya Johnson MD        0.9 % sodium chloride infusion   IntraVENous PRN Sofiya Johnson MD        ondansetron (ZOFRAN) injection 4 mg  4 mg IntraVENous Q6H PRN Sofiya Johnson MD        vancomycin 1500 mg in dextrose 5% 300 mL IVPB  15 mg/kg IntraVENous Once Sofiya Johnson MD        glucose chewable tablet 16 g  4 tablet Oral PRN Sofiya Johnson MD        dextrose bolus 10% 125 mL  125 mL IntraVENous PRN Sofiya Johnson MD        Or    dextrose bolus 10% 250 mL  250 mL IntraVENous PRN Sofiya Johnson MD        glucagon (rDNA) injection 1 mg  1 mg SubCUTAneous PRN Sofyia Johnson MD        dextrose 10 % infusion   IntraVENous Continuous PRN Sofiya Johnson MD            Allergies   Allergen Reactions    Pcn [Penicillins] Other (See Comments)     FEELS COLD    Percocet [Oxycodone-Acetaminophen] Other (See Comments)     Makes him feel \"off\"/ Patient states no allergy to this medication     ROS:   Constitutional: Negative for fever, activity change and appetite change. HENT: Negative for epistaxis. Eyes: Negative for diploplia, blurred vision. Respiratory: Negative for cough, chest tightness, shortness of breath and for wheezing. Cardiovascular: pertinent positives in HPI  Gastrointestinal: Negative for abdominal pain and blood in stool.    All other review of systems are negative     PHYSICAL EXAM:   Vitals:    01/04/23 0904 01/04/23 0907 01/04/23 1345   BP:  (!) 152/61 (!) 165/97   Pulse:  (!) 37 63   Resp:  22 20   Temp: 97.8 °F (36.6 °C) 97.8 °F (36.6 °C)    TempSrc: Temporal     SpO2:  97% 99%   Weight: 215 lb (97.5 kg)     Height: 5' 9\" (1.753 m)        Constitutional: Well-developed, no acute distress  Eyes: conjunctivae normal, no xanthelasma   Ears, Nose, Throat: oral mucosa moist, no cyanosis   CV: no JVD. Regular rate and rhythm. Normal S1S2 and no S3. No murmurs, rubs, or gallops. PMI is nondisplaced  Lungs: clear to auscultation bilaterally, normal respiratory effort without used of accessory muscles  Abdomen: soft, non-tender, bowel sounds present, no masses or hepatomegaly   Musculoskeletal: no digital clubbing, no edema   Skin: warm, no rashes   ILR site: well healed. No erosion, infection or migration    I have personally reviewed the laboratory, cardiac diagnostic and radiographic testing as outlined below:    Data:    Recent Labs     23  0910   WBC 9.2   HGB 13.3   HCT 37.8        Recent Labs     23  0910      K 4.2      CO2 22   BUN 17   CREATININE 0.9   CALCIUM 9.8      Lab Results   Component Value Date/Time    MG 1.9 2023 09:10 AM     No results for input(s): TSH in the last 72 hours. No results for input(s): INR in the last 72 hours. CXR 3/22/19:  Findings: The lungs are symmetrically expanded, and are clear. There is no   evidence of pneumothorax or pleural effusion. Cardiovascular shadows are normal in appearance. There is no evidence   of cardiac enlargement or decompensation. Skeletal structures show no evidence of acute pathology. Overlying EKG   leads are present. Impression   Normal chest.         EK21: Normal sinus rhythm, first degree AV block, RBBB, LAFB: 64 bpm, QTc 439,  ms - Please see scan in Cardiology. TTE: 2020:  ECHO COMPLETE   Order: [id number]   Status:  Edited Result - FINAL   Visible to patient:  No (not released)   Next appt: Today at 03:00 PM in Cardiac Electrophysiology (Danial Bermudez MD)   Dx:  PAF (paroxysmal atrial fibrillation) . ..    Details     Reading Physician  Reading Date  Result Priority    Dariusz Rouse MD  [phone]  2020     Unknown Provider Result 8/6/2020        Narrative & Impression      Transthoracic Echocardiography Report (TTE)      Demographics      Patient Name       Ray Alcantar  Gender              Male                      M      Medical Record     [id number]      Room Number   Number      Account #          [de-identified]     Procedure Date      08/06/2020      Corporate ID                     Ordering Physician  Hoa Cano MD      Accession Number   [id number]    Referring Physician [address] DO      Date of Birth      1950    Sonographer         Rajiv Hilario RDCS      Age                77 year(s)    Interpreting        Cody Perdue MD                                    Physician                                       Any Other     Procedure     Type of Study      TTE procedure:Echo Complete W/ Dop & Color Flow. Procedure Date  Date: 08/06/2020 Start: 08:22 AM     Study Location: Echo Lab  Technical Quality: Adequate visualization     Indications:LV function. Patient Status: Routine     Height: 68 inches Weight: 212 pounds BSA: 2.1 m^2 BMI: 32.23 kg/m^2     BP: 136/62 mmHg      Findings      Left Ventricle   Left ventricular size is grossly normal.   Ejection fraction is visually estimated at 60%. No regional wall motion abnormalities seen. Indeterminate diastolic function. Right Ventricle   Normal right ventricular size and function. Left Atrium   Normal sized left atrium. Right Atrium   Normal right atrium size. Mitral Valve   Physiologic and/or trace mitral regurgitation is present. Tricuspid Valve   Mild tricuspid regurgitation. RVSP is 16 mmHg. Aortic Valve   Aortic valve opens well. Pulmonic Valve   Physiologic and/or trace pulmonic regurgitation present. Pericardial Effusion   No evidence of pericardial effusion. Aorta   Aortic root dimension within normal limits.    Miscellaneous   Normal Inferior Vena Cava diameter and respiratory variation. Conclusions      Summary   Left ventricular size is grossly normal.   Ejection fraction is visually estimated at 60%. No regional wall motion abnormalities seen.       Signature      ----------------------------------------------------------------   Electronically signed by Elvie Angeles MD(Interpreting physician)   on 08/06/2020 12:21 PM   ----------------------------------------------------------------     M-Mode/2D Measurements & Calculations      LV Diastolic    LV Systolic Dimension: 3.5   AV Cusp Separation: 2.1 cmLA   Dimension: 5.2  cm                           Dimension: 3.6 cmAO Root   cm              LV Volume Diastolic: 553.6   Dimension: 3.4 cm   LV FS:32.7 %    ml   LV PW           LV Volume Systolic: 46.6 ml   Diastolic: 0.9  LV EDV/LV EDV Index: 127.6   cm              ml/61 ml/m^2LV ESV/LV ESV   LV PW Systolic: Index: 81.6 XP/34JK/ m^2     LA/Aorta: 1.09   1.3 cm          EF Calculated: 59.4 %        Ascending Aorta: 2.9 cm   Septum          LV Mass Index: 80 l/min*m^2  LA volume/Index: 09.4 ml   Diastolic: 0.9                               /30ml/m^2   cm                                           RA Area: 17.5 cm^2   Septum          LVOT: 2.1 cm   Systolic: 1.2                                IVC Expiration: 0.9 cm   cm      LV Mass: 168.99   g     Doppler Measurements & Calculations      MV Peak E-Wave:   AV Peak Velocity: 1.56 m/s    LVOT Peak Velocity: 0.95   1.05 m/s          AV Peak Gradient: 9.78 mmHg   m/s   MV Peak A-Wave:   AV Mean Velocity: 0.99 m/s    LVOT Mean Velocity: 0.62   0.83 m/s          AV Mean Gradient: 4.5 mmHg    m/s   MV E/A Ratio:     AV VTI: 30 cm                 LVOT Peak Gradient: 3.6   1.26              AV Area (Continuity):2.53     mmHgLVOT Mean Gradient:   MV Peak Gradient: cm^2                          1.8 mmHg   4 mmHg                                          Estimated RVSP: 16.1 mmHg   MV Mean Gradient: LVOT VTI: 21.9 cm Estimated RAP:3 mmHg   1.7 mmHg          IVRT: 78.4 msec   MV Mean Velocity: Estimated PASP: 16.12 mmHg   0.59 m/s          Pulm. Vein A Reversal         TR Velocity:1.81 m/s   MV Deceleration   Duration:117.6 msec           TR Gradient:13.12 mmHg   Time: 166.6 msec  Pulm. Vein D Velocity:0.67    PV Peak Velocity: 0.79 m/s   MV P1/2t: 84.5    m/sPulm. Vein A Reversal      PV Peak Gradient: 2.48   msec              Velocity:0.21 m/s             mmHg   MVA by PHT:2.6    Pulm. Vein S Velocity: 0.61   PV Mean Velocity: 0.51 m/s   cm^2              m/s                           PV Mean Gradient: 1.2 mmHg   MV Area   (continuity): 4.3   cm^2   MV E' Septal   Velocity: 0.08   m/s   MV E' Lateral   Velocity: 16 m/s           Stress: 8/6/2020: Impression:     1. ECG during the infusion did not change. 2. The myocardial perfusion imaging was normal with attenuation   artifact. 3. Overall left ventricular systolic function was normal without   regional wall motion abnormalities. 4. Low risk general pharmacologic stress test.     Thank you for sending your patient to this [facility]. Electronically signed by Jose Armando Campbell DO on 8/6/20 at 4:13 PM   EDT     Thirty day cardiac monitor 4/5/21:  Baseline rhythm was normal sinus. Rare isolated PACs and PVCs were observed. Paroxysmal atrial fibrillation and flutter with burden of < 1% was noted. No SVT or VT. No significant pause or AV block. No triggered event or symptom reported. Thirty day cardiac monitor 9/16/20: I have independently reviewed all of the ECGs and rhythm strips per above     Assessment/Plan:  This is a 71 y.o. male with a history of   Patient Active Problem List   Diagnosis    Chest pain    RBBB (right bundle branch block)    HTN (hypertension)    OA (osteoarthritis)    GERD (gastroesophageal reflux disease)    Valvular heart disease    Palpitations    Paroxysmal atrial fibrillation (HCC)    Lymphoma (HCC)    HB (heart block)    Complete AV block ([facility] [address]     1. Intermittent complete AV block  - In the setting of PAT/PAFon medical therapy and chronic bifascicular block. - Option of treatment discussed. He opted for pacemaker implantation and maximization of medical therapy. - Risks, benefits, and alternatives of permanent pacemaker implantation were discussed in detail today. These risks include but are not limited to bleeding, infection, blood clot, pneumothorax, hemothorax,cardiac valve damage, cardiac perforation and tamponade required emergent thoracotomy, contrast induced nephropathy leading to short or even long term dialysis, vascular injury requiring emergent surgical repair, lead dislodgement, stroke and even death. The patient understands these risks and agrees to proceed with pacemaker implantation. 2. Paroxysmal atrial tachycardia and reported history of paroxysmal atrial fibrillation  - Reported history of paroxysmal atrial fibrillation on 3/23/20 by ED physician.  - No EKG or strip during PAF available for review except EKG that showed runs of paroxysmal atrial tachycardia on 6/11/20.  - [id number] = 2 (HTN and age). - On Eliquis for stroke risk reduction. .  - On Toprol XL for rate control.  - Thus far has had no cardioversion or ablation.  - Thirty day cardiac monitor 9/16/20 showed no PAF. - Presents on 10/28/20 with Gudelias  - Multaq was discontinued 10/28/20 and started on Flecainide 11/1/20.  - Thirty day cardiac monitor 4/5/21 showed AF burden of <1%. - Presents in NSR.  - Re-education on importance of well controlled HTN (goal BP < 130/80), adequate weight control (goal BMI of < 27), physical activity consisting of moderate cardiopulmonary exercise up to a goal of 250 min/wk, daily compliance with CPAP in treating sleep apnea, smoking cessation and limited ETOH intake.      3. ILR in situ  - DOI: 11/22/21  - Indication: Palpitations and reported history of paroxysmal atrial fibrillation  - Normal device function    4. RBBB and LAFB  - Chronic. 5. Hypertension  - On Toprol XL. 6. History of lymphoma  - In remission. 7. GERD  - On Protonix. Recommendations:  1. Will proceed with dual chamber pacemaker implantation and implantable loop recorder explantation. 2. Follow up after the procedure. Encouraged the patient to call the office for any questions or concerns. Thank you for allowing me to participate in your patient's care. Please call me if there are any questions or concerns.       Vinnie Fregoso MD  Cardiac Electrophysiology  [facility]  [facility] [place]: [facility]  2:14 PM  1/4/2023

## 2023-01-04 NOTE — ANESTHESIA POSTPROCEDURE EVALUATION
Department of Anesthesiology  Postprocedure Note    Patient: Shelly Mills  MRN: [id number]  YOB: 1950  Date of evaluation: 1/4/2023      Procedure Summary     Date: 01/04/23 Room / Location: [facility] CATH LAB    Anesthesia Start: 1115 Anesthesia Stop: 1322    Procedure: PACEMAKER IMPLANT W/ANES Diagnosis:       Paroxysmal atrial fibrillation      Endocarditis, valve unspecified      Essential (primary) hypertension      Unspecified right bundle-branch block      HB (heart block)    Scheduled Providers: MAYNOR Rosales CRNA; Napoleon Soriano MD Responsible Provider: Napoleon Soriano MD    Anesthesia Type: MAC ASA Status: 4          Anesthesia Type: No value filed.     Mariaelena Phase I:      Mariaelena Phase II:        Anesthesia Post Evaluation    Patient location during evaluation: PACU  Patient participation: complete - patient participated  Level of consciousness: awake  Pain score: 0  Airway patency: patent  Nausea & Vomiting: no nausea  Complications: no  Cardiovascular status: hemodynamically stable  Respiratory status: acceptable  Hydration status: stable  Multimodal analgesia pain management approach

## 2023-01-04 NOTE — DISCHARGE INSTRUCTIONS
[facility] Pacemaker Instructions    Incision Care:  Leave brown Aquacel dressing on for 1 week. Remove aquacel dressing on Wednesday 1/11/23. Do not remove the steri strips from your incision. They will be removed at your follow up appointment. Keep the incision dry. You may shower; but do not let the water run directly on the incision for 1 week. Check the area daily. Notify the office at [phone] if you develop any redness, swelling, drainage, warmth, or fever greater than 100 degrees. Activity:  You may continue regular activity; but limit strenuous or stretching movements of the arm closest to your pacemaker. Wear the arm immobilizer at all times for 48 hours and then at night for 4 weeks. Avoid pulling yourself up with that arm. Do not raise that elbow higher than shoulder height and do not lift anything weighing more than 2 pounds for 4 weeks. Do not do any activities such as golfing, vacuuming, or mowing the lawn for 4 weeks. Prevent any hard blows to the pacemaker site. Driving: You may drive, if you feel up to it, in 14 days or after your 2 week follow up visit. Start with local/short trips to familiar places. Avoid highway/ high-speed driving for the first few days after you resume driving. DO NOT drive until you have stopped taking prescription pain medications. Special Instructions:  Let your dentist, doctor, or medical specialist know that you have a pacemaker  so precautions can be taken to protect the device. Your device is considered to be MRI conditional; meaning that under certain circumstances, MRI exams may be performed after 6 weeks post implantation  Your pacemaker may set off a metal detector, such as those used in airports and courtrooms. Let security know that you have a pacemaker & show them your card. Remote Monitor:   Many of these monitors have a delay of 3-6 months currently, in some cases an honey can be used with newer smartphones to provide monitoring services. Please discuss this with the device clinic at your follow up appointment in 2 weeks. ID Card: You will have a temporary ID card until a permanent card is sent to you by the device company. The permanent card will look like a 's license or credit card and should arrive within 8 weeks. Carry your ID card with you at all times        Follow Up: You will be seen on 1/18/23 8:30 am at the [address] [place] [address] for incision check and brief pacemaker evaluation. If you need to reschedule this appointment, call the office at [phone]. [facility]    [address] [place] [address]    [phone]

## 2023-01-05 ENCOUNTER — APPOINTMENT (OUTPATIENT)
Dept: GENERAL RADIOLOGY | Age: 73
End: 2023-01-05
Attending: INTERNAL MEDICINE
Payer: MEDICARE

## 2023-01-05 VITALS
OXYGEN SATURATION: 98 % | SYSTOLIC BLOOD PRESSURE: 127 MMHG | BODY MASS INDEX: 31.84 KG/M2 | RESPIRATION RATE: 18 BRPM | HEART RATE: 59 BPM | TEMPERATURE: 98.1 F | WEIGHT: 215 LBS | DIASTOLIC BLOOD PRESSURE: 77 MMHG | HEIGHT: 69 IN

## 2023-01-05 PROBLEM — I44.30 AVB (ATRIOVENTRICULAR BLOCK): Status: ACTIVE | Noted: 2023-01-05

## 2023-01-05 PROBLEM — Z95.0 PRESENCE OF CARDIAC PACEMAKER: Status: ACTIVE | Noted: 2023-01-05

## 2023-01-05 LAB
ANION GAP SERPL CALCULATED.3IONS-SCNC: 13 MMOL/L (ref 7–16)
BUN BLDV-MCNC: 15 MG/DL (ref 6–23)
CALCIUM SERPL-MCNC: 9.3 MG/DL (ref 8.6–10.2)
CHLORIDE BLD-SCNC: 105 MMOL/L (ref 98–107)
CO2: 25 MMOL/L (ref 22–29)
CREAT SERPL-MCNC: 1 MG/DL (ref 0.7–1.2)
GFR SERPL CREATININE-BSD FRML MDRD: >60 ML/MIN/1.73
GLUCOSE BLD-MCNC: 110 MG/DL (ref 74–99)
HCT VFR BLD CALC: 37.8 % (ref 37–54)
HEMOGLOBIN: 12.8 G/DL (ref 12.5–16.5)
MAGNESIUM: 2 MG/DL (ref 1.6–2.6)
MCH RBC QN AUTO: 31.6 PG (ref 26–35)
MCHC RBC AUTO-ENTMCNC: 33.9 % (ref 32–34.5)
MCV RBC AUTO: 93.3 FL (ref 80–99.9)
METER GLUCOSE: 118 MG/DL (ref 74–99)
PDW BLD-RTO: 11.4 FL (ref 11.5–15)
PLATELET # BLD: 273 E9/L (ref 130–450)
PMV BLD AUTO: 10.1 FL (ref 7–12)
POTASSIUM SERPL-SCNC: 4.4 MMOL/L (ref 3.5–5)
RBC # BLD: 4.05 E12/L (ref 3.8–5.8)
SODIUM BLD-SCNC: 143 MMOL/L (ref 132–146)
WBC # BLD: 8.2 E9/L (ref 4.5–11.5)

## 2023-01-05 PROCEDURE — 6360000002 HC RX W HCPCS: Performed by: INTERNAL MEDICINE

## 2023-01-05 PROCEDURE — 6370000000 HC RX 637 (ALT 250 FOR IP): Performed by: INTERNAL MEDICINE

## 2023-01-05 PROCEDURE — 99024 POSTOP FOLLOW-UP VISIT: CPT | Performed by: STUDENT IN AN ORGANIZED HEALTH CARE EDUCATION/TRAINING PROGRAM

## 2023-01-05 PROCEDURE — 36415 COLL VENOUS BLD VENIPUNCTURE: CPT

## 2023-01-05 PROCEDURE — 80048 BASIC METABOLIC PNL TOTAL CA: CPT

## 2023-01-05 PROCEDURE — 85027 COMPLETE CBC AUTOMATED: CPT

## 2023-01-05 PROCEDURE — 82962 GLUCOSE BLOOD TEST: CPT

## 2023-01-05 PROCEDURE — G0378 HOSPITAL OBSERVATION PER HR: HCPCS

## 2023-01-05 PROCEDURE — 2580000003 HC RX 258: Performed by: INTERNAL MEDICINE

## 2023-01-05 PROCEDURE — 71045 X-RAY EXAM CHEST 1 VIEW: CPT

## 2023-01-05 PROCEDURE — 83735 ASSAY OF MAGNESIUM: CPT

## 2023-01-05 RX ORDER — METOPROLOL SUCCINATE 50 MG/1
50 TABLET, EXTENDED RELEASE ORAL DAILY
Qty: 30 TABLET | Refills: 2 | Status: SHIPPED | OUTPATIENT
Start: 2023-01-05

## 2023-01-05 RX ADMIN — FLECAINIDE ACETATE 100 MG: 100 TABLET ORAL at 08:14

## 2023-01-05 RX ADMIN — ACETAMINOPHEN 650 MG: 325 TABLET ORAL at 08:14

## 2023-01-05 RX ADMIN — SODIUM CHLORIDE, PRESERVATIVE FREE 10 ML: 5 INJECTION INTRAVENOUS at 08:15

## 2023-01-05 RX ADMIN — VANCOMYCIN HYDROCHLORIDE 1500 MG: 10 INJECTION, POWDER, LYOPHILIZED, FOR SOLUTION INTRAVENOUS at 00:33

## 2023-01-05 RX ADMIN — GLIPIZIDE 5 MG: 5 TABLET ORAL at 08:14

## 2023-01-05 RX ADMIN — PANTOPRAZOLE SODIUM 40 MG: 40 TABLET, DELAYED RELEASE ORAL at 06:53

## 2023-01-05 RX ADMIN — METOPROLOL SUCCINATE 50 MG: 50 TABLET, EXTENDED RELEASE ORAL at 08:14

## 2023-01-05 ASSESSMENT — PAIN SCALES - GENERAL
PAINLEVEL_OUTOF10: 0
PAINLEVEL_OUTOF10: 5
PAINLEVEL_OUTOF10: 5

## 2023-01-05 ASSESSMENT — PAIN DESCRIPTION - FREQUENCY: FREQUENCY: INTERMITTENT

## 2023-01-05 ASSESSMENT — PAIN DESCRIPTION - DESCRIPTORS: DESCRIPTORS: ACHING;DISCOMFORT;SORE

## 2023-01-05 ASSESSMENT — PAIN DESCRIPTION - PAIN TYPE: TYPE: SURGICAL PAIN

## 2023-01-05 ASSESSMENT — PAIN DESCRIPTION - ONSET: ONSET: GRADUAL

## 2023-01-05 ASSESSMENT — PAIN DESCRIPTION - ORIENTATION: ORIENTATION: ANTERIOR;LEFT

## 2023-01-05 ASSESSMENT — PAIN DESCRIPTION - LOCATION: LOCATION: INCISION

## 2023-01-05 ASSESSMENT — PAIN - FUNCTIONAL ASSESSMENT: PAIN_FUNCTIONAL_ASSESSMENT: ACTIVITIES ARE NOT PREVENTED

## 2023-01-05 NOTE — NURSE NAVIGATOR
ARRHYTHMIA EDUCATION     Met with patient to review information relating to Intermittent CHB, RBBB  & PPM Insertion    Discussed the following topics: The Heart's Electrical System, CHB, PPM, Post Operative Care of PPM,  Arm Restrictions,  & PPM DC Instructions, and LINQ removal d/c instructions. Handouts given on above topics given & questions answered. Patient verbalized understanding as evidenced by \"teach back\". Time spent with patient: 20 minutes.

## 2023-01-05 NOTE — PATIENT CARE CONFERENCE
[facility] Quality Flow/Interdisciplinary Rounds Progress Note        Quality Flow Rounds held on January 5, 2023    Disciplines Attending:  Bedside Nurse, , , and Nursing Unit Leadership    Andrea June Sr was admitted on 1/4/2023  1:44 PM    Anticipated Discharge Date:       Disposition:    Presley Score:  Presley Scale Score: 22    Readmission Risk              Risk of Unplanned Readmission:  0           Discussed patient goal for the day, patient clinical progression, and barriers to discharge.   The following Goal(s) of the Day/Commitment(s) have been identified:  Labs - Report Results      Oziel Sifuentes RN  January 5, 2023

## 2023-01-05 NOTE — PROGRESS NOTES
Discharge instructions provided to patient. Information regarding medications, care of the pacemaker insertion site, and follow up appointments given. IVs removed and dressings applied. Monitor removed, cleaned, and returned to nurses station. Importance of taking medications as prescribed discussed at length with verbalized understanding. Bedside monitor sent with patient. Immobilizer remains intact on patient.

## 2023-01-05 NOTE — PROGRESS NOTES
[facility],[address] Floor and [facility] Electrophysiology  Inpatient progress report  PATIENT: Kapil Calvo Sr  MEDICAL RECORD NUMBER: [id number]  DATE OF SERVICE:  1/5/2023  PRIMARY ELECTROPHYSIOLOGIST: Maddy Munoz MD   ATTENDING ELECTROPHYSIOLOGIST: Leonard Rodríguez DO   REFERRING PHYSICIAN: Kory Remy DO  CHIEF COMPLAINT: Paroxysmal atrial tachycardia with reported history of paroxysmal atrial fibrillation    HPI: This is a 71 y.o. male with a history of   Patient Active Problem List   Diagnosis    Chest pain    RBBB (right bundle branch block)    HTN (hypertension)    OA (osteoarthritis)    GERD (gastroesophageal reflux disease)    Valvular heart disease    Palpitations    Paroxysmal atrial fibrillation (HCC)    Lymphoma ([address]    HB (heart block)    Complete AV block ([address]   who presents to hospital for elective implantable loop recorder insertion. The patient has history of PAR and reported history of PAF on 3/23/20 by ED physician. No EKG or strip during PAF available for review except EKG that showed runs of paroxysmal atrial tachycardia on 6/11/20. Risks, benefits and alternatives of ILR insertion were discussed. He verbalizes understanding and agrees to proceed with the procedure. 1/4/22: The patient underwent ILR insertion on 11/22/21. He was found to have symptomatic intermittent complete AV block on 11/29/22 and 12/1 22 with asystole at 11  and 10 seconds respectively. He reports dizziness during episodes. During history of PAF required medi valeria therapy, chronic bifascicular block and intermittent complete AV block, dual chamber pacemaker implantation and ILR extraction were advised. 01/05/2023: The patient is post Pacemaker with normal device function today only mild complaints of discomfort near the device site.        .   Patient Active Problem List    Diagnosis Date Noted    HB (heart block) 01/04/2023     Priority: Medium    Complete AV block ([address] 01/04/2023     Priority: Medium    Palpitations 03/22/2019    Paroxysmal atrial fibrillation ([facility] 03/22/2019    Lymphoma ([facility] 03/22/2019    HTN (hypertension) 07/14/2014    OA (osteoarthritis) 07/14/2014    GERD (gastroesophageal reflux disease) 07/14/2014    Valvular heart disease 07/14/2014    Chest pain 01/18/2012    RBBB (right bundle branch block) 01/18/2012       Past Medical History:   Diagnosis Date    Arthritis     SHOULDERS,let thumb    Cancer ([facility]     prostate 12/2016    Diabetes mellitus (HCC)     GERD (gastroesophageal reflux disease)     Hypertension     STABLE    Left carpal tunnel syndrome     Lymphoma (HCC)     Seasonal allergies     Skin lesions, generalized MULTIPLE    ARMS, BACK, KNEE, healed, presently no open wounds 5/4/17       Family History   Problem Relation Age of Onset    Heart Disease Mother     Heart Failure Mother     Alzheimer's Disease Father        Social History     Tobacco Use    Smoking status: Never    Smokeless tobacco: Never   Substance Use Topics    Alcohol use: No       Current Facility-Administered Medications   Medication Dose Route Frequency Provider Last Rate Last Admin    acetaminophen (TYLENOL) tablet 650 mg  650 mg Oral Q6H PRN George Carias MD        pantoprazole (PROTONIX) tablet 40 mg  40 mg Oral QAM AC Crystal Traylor MD   40 mg at 01/05/23 0653    metoprolol succinate (TOPROL XL) extended release tablet 50 mg  50 mg Oral Daily Crystal Traylro MD   50 mg at 01/04/23 1610    flecainide (TAMBOCOR) tablet 100 mg  100 mg Oral BID Crystal Traylor MD   100 mg at 01/04/23 2216    glipiZIDE (GLUCOTROL) tablet 5 mg  5 mg Oral Daily Crystal Traylor MD        sodium chloride flush 0.9 % injection 5-40 mL  5-40 mL IntraVENous 2 times per day Crystal Traylor MD   10 mL at 01/04/23 2217    sodium chloride flush 0.9 % injection 5-40 mL  5-40 mL IntraVENous PRN Crystal Traylor MD        0.9 % sodium chloride infusion   IntraVENous PRN Kenrick Zhu MD        ondansetron (ZOFRAN) injection 4 mg  4 mg IntraVENous Q6H PRN Kenrick Zhu MD        glucose chewable tablet 16 g  4 tablet Oral PRN Kenrick Zhu MD        dextrose bolus 10% 125 mL  125 mL IntraVENous PRN Kenrick Zhu MD        Or    dextrose bolus 10% 250 mL  250 mL IntraVENous PRN Kenrick Zhu MD        glucagon (rDNA) injection 1 mg  1 mg SubCUTAneous PRN Kenrick Zhu MD        dextrose 10 % infusion   IntraVENous Continuous PRN George Carias MD            Allergies   Allergen Reactions    Pcn [Penicillins] Shortness Of Breath     FEELS COLD    Percocet [Oxycodone-Acetaminophen] Other (See Comments)     Makes him feel \"off\"/ Patient states no allergy to this medication     ROS:   Constitutional: Negative for fever, activity change and appetite change. HENT: Negative for epistaxis. Eyes: Negative for diploplia, blurred vision. Respiratory: Negative for cough, chest tightness, shortness of breath and for wheezing. Cardiovascular: pertinent positives in HPI  Gastrointestinal: Negative for abdominal pain and blood in stool. All other review of systems are negative     PHYSICAL EXAM:   Vitals:    01/04/23 1600 01/04/23 1941 01/04/23 2343 01/05/23 0342   BP:  121/63 (!) 147/72 137/79   Pulse: 60 58 58 62   Resp:  18 18 18   Temp:  98.1 °F (36.7 °C) 98.2 °F (36.8 °C) 98.4 °F (36.9 °C)   TempSrc:  Temporal Temporal Temporal   SpO2:  97% 96% 99%   Weight:       Height:          Constitutional: Well-developed, no acute distress  Eyes: conjunctivae normal, no xanthelasma   Ears, Nose, Throat: oral mucosa moist, no cyanosis   CV: no JVD. Regular rate and rhythm. Normal S1S2 and no S3. No murmurs, rubs, or gallops.  PMI is nondisplaced  Lungs: clear to auscultation bilaterally, normal respiratory effort without used of accessory muscles  Abdomen: soft, non-tender, bowel sounds present, no masses or hepatomegaly   Musculoskeletal: no digital clubbing, no edema   Skin: warm, no rashes   ILR site: Aquacell intact   Device site: Aquacell intact no edema no ecchymosis. I have personally reviewed the laboratory, cardiac diagnostic and radiographic testing as outlined below:    Data:    Recent Labs     23  0910 23  0602   WBC 9.2 8.2   HGB 13.3 12.8   HCT 37.8 37.8    273     Recent Labs     23  0910      K 4.2      CO2 22   BUN 17   CREATININE 0.9   CALCIUM 9.8      Lab Results   Component Value Date/Time    MG 1.9 2023 09:10 AM     No results for input(s): TSH in the last 72 hours. No results for input(s): INR in the last 72 hours. CXR 3/22/19:  Findings: The lungs are symmetrically expanded, and are clear. There is no   evidence of pneumothorax or pleural effusion. Cardiovascular shadows are normal in appearance. There is no evidence   of cardiac enlargement or decompensation. Skeletal structures show no evidence of acute pathology. Overlying EKG   leads are present. Impression   Normal chest.         EK2023:  AP-VS with a bifascicular block and a 1 st degree AV block rate 60bpm,  QRS 186ms QTc 454ms  - Please see scan in Cardiology. TTE 2023:   Left Ventricle   Normal left ventricle size and systolic function. Ejection fraction is visually estimated at 55-60%. Images foreshortened. No regional wall motion abnormalities seen. Severe concentric left ventricular hypertrophy. Indeterminate diastolic function. Right Ventricle   Normal right ventricular size and function. Left Atrium   Left atrium is of normal size. Right Atrium   Right Atrium is not clearly visualized. Mitral Valve   Normal mitral valve structure and function. No mitral valve stenosis present. Physiologic and/or trace mitral regurgitation is present.       Tricuspid Valve   The tricuspid valve appears structurally normal.   Unable to estimate PA systolic pressure. Aortic Valve   Structurally normal aortic valve. The aortic valve is trileaflet. No hemodynamically significant aortic stenosis is present. ASHWINI by direct   planimetry is 3.5 cm2. No evidence of aortic valve regurgitation. Pulmonic Valve   The pulmonic valve was not well visualized. Pericardial Effusion   No evidence for hemodynamically significant pericardial effusion. Pleural Effusion   No evidence of pleural effusion. Aorta   Aortic root is somewhat fibrocalcified. Miscellaneous   Inferior Vena Cava not well visualized. Conclusions      Summary   Normal left ventricle size and systolic function. Ejection fraction is visually estimated at 55-60%. Images are   foreshortened. No regional wall motion abnormalities seen. Severe concentric left ventricular hypertrophy. Indeterminate diastolic function. Normal right ventricular size and function. No hemodynamically significant aortic stenosis is present. ASHWINI by direct   planimetry is 3.5 cm2. Unable to estimate PA systolic pressure. No evidence for hemodynamically significant pericardial effusion. Technically difficult examination. Definity US enhancing agent was used to   delineate endocardial borders. Signature      ----------------------------------------------------------------   Electronically signed by Nieves Jiménez MD(Interpreting   physician) on 01/04/2023 11:29 AM   ----------------------------------------------------------------       TTE: 8/6/2020:  ECHO COMPLETE   Order: [id number]   Status:  Edited Result - FINAL   Visible to patient:  No (not released)   Next appt: Today at 03:00 PM in Cardiac Electrophysiology (Giovanni Leventhal, MD)   Dx:  PAF (paroxysmal atrial fibrillation) . ..    Details     Reading Physician  Reading Date  Result Priority    Kris Armenta MD  [phone]  8/6/2020     Unknown Provider Result  8/6/2020        Narrative & Impression      Transthoracic Echocardiography Report (TTE)      Demographics      Patient Name       Josey Arredondo  Gender              Male                      [address]     [id number]      Room Number   Number      Account #          [de-identified]     Procedure Date      08/06/2020      Corporate ID                     Ordering Physician  Nava Arguello MD      Accession Number   [id number]    Referring Physician [address] DO      Date of Birth      1950    Sonographer         Alberto Ishan RDCS      Age                77 year(s)    Interpreting        Jina Mancuso MD                                    Physician                                       Any Other     Procedure     Type of Study      TTE procedure:Echo Complete W/ Dop & Color Flow. Procedure Date  Date: 08/06/2020 Start: 08:22 AM     Study Location: Echo Lab  Technical Quality: Adequate visualization     Indications:LV function. Patient Status: Routine     Height: 68 inches Weight: 212 pounds BSA: 2.1 m^2 BMI: 32.23 kg/m^2     BP: 136/62 mmHg      Findings      Left Ventricle   Left ventricular size is grossly normal.   Ejection fraction is visually estimated at 60%. No regional wall motion abnormalities seen. Indeterminate diastolic function. Right Ventricle   Normal right ventricular size and function. Left Atrium   Normal sized left atrium. Right Atrium   Normal right atrium size. Mitral Valve   Physiologic and/or trace mitral regurgitation is present. Tricuspid Valve   Mild tricuspid regurgitation. RVSP is 16 mmHg. Aortic Valve   Aortic valve opens well. Pulmonic Valve   Physiologic and/or trace pulmonic regurgitation present. Pericardial Effusion   No evidence of pericardial effusion. Aorta   Aortic root dimension within normal limits. Miscellaneous   Normal Inferior Vena Cava diameter and respiratory variation.       Conclusions      Summary Left ventricular size is grossly normal.   Ejection fraction is visually estimated at 60%. No regional wall motion abnormalities seen.       Signature      ----------------------------------------------------------------   Electronically signed by Susy Guerrero MD(Interpreting physician)   on 08/06/2020 12:21 PM   ----------------------------------------------------------------     M-Mode/2D Measurements & Calculations      LV Diastolic    LV Systolic Dimension: 3.5   AV Cusp Separation: 2.1 cmLA   Dimension: 5.2  cm                           Dimension: 3.6 cmAO Root   cm              LV Volume Diastolic: 652.0   Dimension: 3.4 cm   LV FS:32.7 %    ml   LV PW           LV Volume Systolic: 14.7 ml   Diastolic: 0.9  LV EDV/LV EDV Index: 127.6   cm              ml/61 ml/m^2LV ESV/LV ESV   LV PW Systolic: Index: 73.6 LR/59KQ/ m^2     LA/Aorta: 1.09   1.3 cm          EF Calculated: 59.4 %        Ascending Aorta: 2.9 cm   Septum          LV Mass Index: 80 l/min*m^2  LA volume/Index: 35.7 ml   Diastolic: 0.9                               /30ml/m^2   cm                                           RA Area: 17.5 cm^2   Septum          LVOT: 2.1 cm   Systolic: 1.2                                IVC Expiration: 0.9 cm   cm      LV Mass: 168.99   g     Doppler Measurements & Calculations      MV Peak E-Wave:   AV Peak Velocity: 1.56 m/s    LVOT Peak Velocity: 0.95   1.05 m/s          AV Peak Gradient: 9.78 mmHg   m/s   MV Peak A-Wave:   AV Mean Velocity: 0.99 m/s    LVOT Mean Velocity: 0.62   0.83 m/s          AV Mean Gradient: 4.5 mmHg    m/s   MV E/A Ratio:     AV VTI: 30 cm                 LVOT Peak Gradient: 3.6   1.26              AV Area (Continuity):2.53     mmHgLVOT Mean Gradient:   MV Peak Gradient: cm^2                          1.8 mmHg   4 mmHg                                          Estimated RVSP: 16.1 mmHg   MV Mean Gradient: LVOT VTI: 21.9 cm             Estimated RAP:3 mmHg   1.7 mmHg          IVRT: 78.4 msec   MV Mean Velocity: Estimated PASP: 16.12 mmHg   0.59 m/s          Pulm. Vein A Reversal         TR Velocity:1.81 m/s   MV Deceleration   Duration:117.6 msec           TR Gradient:13.12 mmHg   Time: 166.6 msec  Pulm. Vein D Velocity:0.67    PV Peak Velocity: 0.79 m/s   MV P1/2t: 84.5    m/sPulm. Vein A Reversal      PV Peak Gradient: 2.48   msec              Velocity:0.21 m/s             mmHg   MVA by PHT:2.6    Pulm. Vein S Velocity: 0.61   PV Mean Velocity: 0.51 m/s   cm^2              m/s                           PV Mean Gradient: 1.2 mmHg   MV Area   (continuity): 4.3   cm^2   MV E' Septal   Velocity: 0.08   m/s   MV E' Lateral   Velocity: 16 m/s           Stress: 8/6/2020: Impression:     1. ECG during the infusion did not change. 2. The myocardial perfusion imaging was normal with attenuation   artifact. 3. Overall left ventricular systolic function was normal without   regional wall motion abnormalities. 4. Low risk general pharmacologic stress test.     Thank you for sending your patient to this [facility]. Electronically signed by Chichi Rubio DO on 8/6/20 at 4:13 PM   EDT     Thirty day cardiac monitor 4/5/21:  Baseline rhythm was normal sinus. Rare isolated PACs and PVCs were observed. Paroxysmal atrial fibrillation and flutter with burden of < 1% was noted. No SVT or VT. No significant pause or AV block. No triggered event or symptom reported.     Thirty day cardiac monitor 9/16/20:        Device Interrogation ( 01/05/2023 )  Make/Model MDT [address]  Mode AAIR<--> DDDR 60/130  P wave: 5.0 mV  Impedance: 589 ohms   Threshold: 0.5 V @ 0.4 ms  RV R wave: 4.4 mV  Impedance: 741 ohms   Threshold: 0.5 V @ 0.4 ms  Pacing: A: 87.3%  RV: 0.8%    Battery Voltage/Longevity:  Initializing     Arrhythmias: none   Reprogramming included None   Overall device function is normal  All device programmable settings were evaluated per above and in the scanned document, along with iterative adjustments (capture thresholds) to assess and select the most appropriate final programming to provide for consistent delivery of the appropriate therapy and to verify function of the device. I have independently reviewed all of the ECGs and rhythm strips per above     Assessment/Plan: This is a 71 y.o. male with a history of   Patient Active Problem List   Diagnosis    Chest pain    RBBB (right bundle branch block)    HTN (hypertension)    OA (osteoarthritis)    GERD (gastroesophageal reflux disease)    Valvular heart disease    Palpitations    Paroxysmal atrial fibrillation (HCC)    Lymphoma (HCC)    HB (heart block)    Complete AV block ([address]     1. Intermittent complete AV block  - In the setting of PAT/PAFon medical therapy and chronic bifascicular block. 2. Pacemaker in situ   - DOI 01/04/2023  - [employer]   - Normal device function. 3. Paroxysmal atrial tachycardia and reported history of paroxysmal atrial fibrillation  - Reported history of paroxysmal atrial fibrillation on 3/23/20 by ED physician.  - No EKG or strip during PAF available for review except EKG that showed runs of paroxysmal atrial tachycardia on 6/11/20.  - [id number] = 2 (HTN and age). - On Eliquis for stroke risk reduction. .  - On Toprol XL for rate control.  - Thus far has had no cardioversion or ablation.  - Thirty day cardiac monitor 9/16/20 showed no PAF. - Presents on 10/28/20 with [facility]  - Multaq was discontinued 10/28/20 and started on Flecainide 11/1/20.  - Thirty day cardiac monitor 4/5/21 showed AF burden of <1%. - Presents in NSR.  - Re-education on importance of well controlled HTN (goal BP < 130/80), adequate weight control (goal BMI of < 27), physical activity consisting of moderate cardiopulmonary exercise up to a goal of 250 min/wk, daily compliance with CPAP in treating sleep apnea, smoking cessation and limited ETOH intake.      4. ILR in situ  - DOI: 11/22/21  - Indication: Palpitations and reported history of paroxysmal atrial fibrillation  - Normal device function    5. RBBB and LAFB  - Chronic. 6. Hypertension  - On Toprol XL. 7. History of lymphoma  - In remission. 8. GERD  - On Protonix. Recommendations:  1. Normal Device function. 2. Standard Device arm restriction and wound care. 3. Will resume Eliquis on Monday. 4. [id number] Tania Sunshine for discharge today. Thank you for allowing me to participate in your patient's care. Please call me if there are any questions or concerns. Dicussed with Dr. Shannon Holliday. Uriel Olivarez, MAYNOR - CNP  Cardiac Electrophysiology  [facility]) Physicians  [facility] [place]: [facility]  7:34 AM  1/5/2023    Attending Supervising Physicians [address] Statement  I performed at least 51% of the work. I was present with the nurse practitioner during the history and exam. I discussed the findings and plans with the nurse practitioner and agree as documented in his note .     Electronically signed by Sheran Schirmer, DO on 1/5/23 at 2:14 PM EST

## 2023-01-05 NOTE — DISCHARGE SUMMARY
[address] [facility] Electrophysiology  Discharge Summary  Patient: Bakari Hirsch  Medical Record Number: [id number]  Date of Procedure:  1/5/2023  Operating Electrophysiologist: Kenrick Zhu MD  Primary Electrophysiologist: Kenrick Zhu MD  Referring Physician: Yecenia Ho DO     Admission Date:1/4/2023      Discharge Date:  01/05/2023    Patient Active Problem List   Diagnosis    Chest pain    RBBB (right bundle branch block)    HTN (hypertension)    OA (osteoarthritis)    GERD (gastroesophageal reflux disease)    Valvular heart disease    Palpitations    Paroxysmal atrial fibrillation (HCC)    Lymphoma ([facility] [address]    HB (heart block)    Complete AV block ([facility] [address]          Medication List        CHANGE how you take these medications      apixaban 5 MG Tabs tablet  Commonly known as: ELIQUIS  Take 1 tablet by mouth 2 times daily  Start taking on: January 9, 2023  What changed: These instructions start on January 9, 2023. If you are unsure what to do until then, ask your doctor or other care provider.             CONTINUE taking these medications      acetaminophen 325 MG tablet  Commonly known as: TYLENOL     CENTRUM SILVER PO     ELDERBERRY PO     Fish Oil + D3 [id number] MG-UNIT Caps     flecainide 100 MG tablet  Commonly known as: TAMBOCOR  TAKE 1 TABLET TWICE A DAY     glipiZIDE 5 MG tablet  Commonly known as: GLUCOTROL     metoprolol succinate 50 MG extended release tablet  Commonly known as: TOPROL XL  Take 1 tablet by mouth daily     pantoprazole 40 MG tablet  Commonly known as: PROTONIX               Where to Get Your Medications        These medications were sent [facility] [id number], [facility] [place] [id number]      Phone: [phone]   apixaban 5 MG Tabs tablet  metoprolol succinate 50 MG extended release tablet         Hospital Course: Andrea Reges Sr is a 71 y.o. male with a past medical history of PAR and reported history of PAF on 03/23/2020 by the ED, he underwent implantable loop insertion on 11/22/2021 which showed intermittent complete AV block on 11/29/2022 and 12/1/2022 who with asystole for 11 and 10 seconds respectively. He underwent placement of a Medtronic dual-chamber permanent pacemaker on 01/04/2023 with removal of ILR at that time. On postop day 1 he complains of mild discomfort at the device site there is no edema, ecchymosis and the device function is normal.    Procedures Performed 01/04/2023:  1. Insertion of Dual Chamber Permanent Pacemaker (Medtronic- MRI conditional)  2. LINQ Insertable Cardiac Monitor Explantation   3. Left upper extremity venography  4. Fluoroscopy     Consultants: none. Disposition: The patient was discharged to home in stable condition on the above medications. Clinical follow-up in the office in 2 weeks with the device clinic. Discussed with Dr. Nhan Gale. MAYNOR Kirk - CNP  Cardiac Electrophysiology  [facility]) Physicians  [facility] [place]: [place] [facility]  10:34 AM  1/5/2023    I spent > 30 minutes. Attending Supervising Physicians Attestation Statement  I performed at least 51% of the work.  I was present with the nurse practitioner during the history and exam. I discussed the findings and plans with the nurse practitioner and agree as documented in his note     Electronically signed by Paulina Santiago DO on 1/5/23 at 2:13 PM EST

## 2023-01-06 ENCOUNTER — TELEPHONE (OUTPATIENT)
Dept: NON INVASIVE DIAGNOSTICS | Age: 73
End: 2023-01-06

## 2023-01-06 NOTE — TELEPHONE ENCOUNTER
Patient called in to device clinic with questions post pacemaker implant. Had ILR explanted and pacemaker implanted on 01/04/2023. Wanted to know if monitor was working. Informed him that per [employer] website there are 2 transmissions from 01/05/2023. Patient also asked if an electric toothbrush was ok to use. Informed him it would be ok to use. Patient also asked about issues of cell phone use affecting pacemaker function. Instructed him to keep cell phone 6-12 inches away from pacer site just to be safe.

## 2023-01-11 ENCOUNTER — TELEPHONE (OUTPATIENT)
Dept: CARDIAC CATH/INVASIVE PROCEDURES | Age: 73
End: 2023-01-11

## 2023-01-11 NOTE — TELEPHONE ENCOUNTER
I called Mr. Benigno Chauhan to see how he's doing since his PPM insertion on 1/4/23. He states he's doing fine & that the aquacel dressing was removed this morning. He denies any fevers, redness, bleeding, drainage, or swelling from PPM incision site. I reminded him not to touch the steri-strips-Leave them intact, & to continue to wear his sling at night for 4 weeks; along with not abducting the L arm above the shoulder. He's also aware of his follow up appt at the [address] [place] [address] Drive on 1/18/23 at 8:30 am.  He offers no complaints & is thankful for the phone call.

## 2023-01-16 RX ORDER — METOPROLOL SUCCINATE 50 MG/1
50 TABLET, EXTENDED RELEASE ORAL DAILY
Qty: 90 TABLET | Refills: 3 | Status: SHIPPED | OUTPATIENT
Start: 2023-01-16

## 2023-01-18 ENCOUNTER — NURSE ONLY (OUTPATIENT)
Dept: NON INVASIVE DIAGNOSTICS | Age: 73
End: 2023-01-18

## 2023-01-18 NOTE — PROGRESS NOTES
See klaudia report    Santy Joseph RN, BSN  [facility] and [address] [place] [address]

## 2023-01-18 NOTE — PATIENT INSTRUCTIONS
Continue arm restrictions until 2/2/23  You may shower starting today    Call if any signs or symptoms of infection [phone] ext: [phone]  Fevers, chills, redness, swelling or drainage.        Hook up home  Monitor  [facility] connected: [phone]

## 2023-01-20 ENCOUNTER — TELEPHONE (OUTPATIENT)
Dept: NON INVASIVE DIAGNOSTICS | Age: 73
End: 2023-01-20

## 2023-01-20 NOTE — TELEPHONE ENCOUNTER
Patient called in asking when he can return to work as a . He had a pacemaker implanted on 1/4/2023 for intermittent CHB with paroxysmal atrial tachycardia and atrial fibrillation. I will forward to Dr. Ana Mishra.     Aaron Menendez RN, BSN  [facility]

## 2023-01-20 NOTE — TELEPHONE ENCOUNTER
I called Essie Nugent back and informed him Dr. Boni Blevins stated he can RTW 4-6 weeks from time of implant. Patient is checking with his employer and will call the office back.     Valentin Matthews RN, BSN  [facility]

## 2023-01-20 NOTE — TELEPHONE ENCOUNTER
----- Message from Vesna Sandoval MD sent at 1/20/2023 11:07 AM EST -----  At least 4 to 6 weeks post procedure. Thanks.  ----- Message -----  From: Dyana Chanel RN  Sent: 1/20/2023  11:01 AM EST  To: Vesna Sandoval MD    Patient had a dual chamber pacemaker implanted on 1/4/23 for intermittent CHB and PAF/PAT. He drives a city bus. He wants to know when he can RTW.

## 2023-01-20 NOTE — TELEPHONE ENCOUNTER
Patient called in stating he would like to RTW on 2/6/2023. He will need a letter stating he may RTW on this date. I told him I will send the letter to him in the mail.     Qiana Anderson RN, BSN  [facility]

## 2023-01-29 ENCOUNTER — APPOINTMENT (OUTPATIENT)
Dept: GENERAL RADIOLOGY | Age: 73
End: 2023-01-29
Payer: MEDICARE

## 2023-01-29 ENCOUNTER — HOSPITAL ENCOUNTER (EMERGENCY)
Age: 73
Discharge: HOME | End: 2023-01-29
Payer: MEDICARE

## 2023-01-29 VITALS
TEMPERATURE: 97.9 F | HEART RATE: 77 BPM | RESPIRATION RATE: 18 BRPM | SYSTOLIC BLOOD PRESSURE: 175 MMHG | DIASTOLIC BLOOD PRESSURE: 83 MMHG | OXYGEN SATURATION: 98 %

## 2023-01-29 DIAGNOSIS — J40 SINOBRONCHITIS: ICD-10-CM

## 2023-01-29 DIAGNOSIS — J32.9 SINOBRONCHITIS: ICD-10-CM

## 2023-01-29 DIAGNOSIS — H10.022 OTHER MUCOPURULENT CONJUNCTIVITIS OF LEFT EYE: Primary | ICD-10-CM

## 2023-01-29 DIAGNOSIS — M10.00 IDIOPATHIC GOUT, UNSPECIFIED CHRONICITY, UNSPECIFIED SITE: ICD-10-CM

## 2023-01-29 LAB
ALBUMIN SERPL-MCNC: 4.1 G/DL (ref 3.5–5.2)
ALP BLD-CCNC: 82 U/L (ref 40–129)
ALT SERPL-CCNC: 6 U/L (ref 0–40)
ANION GAP SERPL CALCULATED.3IONS-SCNC: 13 MMOL/L (ref 7–16)
ANISOCYTOSIS: ABNORMAL
AST SERPL-CCNC: 15 U/L (ref 0–39)
BASOPHILS ABSOLUTE: 0.04 E9/L (ref 0–0.2)
BASOPHILS RELATIVE PERCENT: 0.3 % (ref 0–2)
BILIRUB SERPL-MCNC: 0.8 MG/DL (ref 0–1.2)
BUN BLDV-MCNC: 13 MG/DL (ref 6–23)
CALCIUM SERPL-MCNC: 9.8 MG/DL (ref 8.6–10.2)
CHLORIDE BLD-SCNC: 103 MMOL/L (ref 98–107)
CO2: 25 MMOL/L (ref 22–29)
CREAT SERPL-MCNC: 0.9 MG/DL (ref 0.7–1.2)
EOSINOPHILS ABSOLUTE: 0.09 E9/L (ref 0.05–0.5)
EOSINOPHILS RELATIVE PERCENT: 0.7 % (ref 0–6)
GFR SERPL CREATININE-BSD FRML MDRD: >60 ML/MIN/1.73
GLUCOSE BLD-MCNC: 210 MG/DL (ref 74–99)
HCT VFR BLD CALC: 35.3 % (ref 37–54)
HEMOGLOBIN: 12.2 G/DL (ref 12.5–16.5)
HYPOCHROMIA: ABNORMAL
IMMATURE GRANULOCYTES #: 0.03 E9/L
IMMATURE GRANULOCYTES %: 0.2 % (ref 0–5)
LYMPHOCYTES ABSOLUTE: 1.52 E9/L (ref 1.5–4)
LYMPHOCYTES RELATIVE PERCENT: 12.3 % (ref 20–42)
MCH RBC QN AUTO: 31 PG (ref 26–35)
MCHC RBC AUTO-ENTMCNC: 34.6 % (ref 32–34.5)
MCV RBC AUTO: 89.8 FL (ref 80–99.9)
MONOCYTES ABSOLUTE: 1.51 E9/L (ref 0.1–0.95)
MONOCYTES RELATIVE PERCENT: 12.2 % (ref 2–12)
NEUTROPHILS ABSOLUTE: 9.17 E9/L (ref 1.8–7.3)
NEUTROPHILS RELATIVE PERCENT: 74.3 % (ref 43–80)
PDW BLD-RTO: 11.9 FL (ref 11.5–15)
PLATELET # BLD: 263 E9/L (ref 130–450)
PMV BLD AUTO: 9.5 FL (ref 7–12)
POLYCHROMASIA: ABNORMAL
POTASSIUM REFLEX MAGNESIUM: 4.4 MMOL/L (ref 3.5–5)
PRO-BNP: 71 PG/ML (ref 0–125)
RBC # BLD: 3.93 E12/L (ref 3.8–5.8)
SODIUM BLD-SCNC: 141 MMOL/L (ref 132–146)
TOTAL PROTEIN: 7.6 G/DL (ref 6.4–8.3)
TROPONIN, HIGH SENSITIVITY: 24 NG/L (ref 0–11)
TROPONIN, HIGH SENSITIVITY: 25 NG/L (ref 0–11)
URIC ACID, SERUM: 8 MG/DL (ref 3.4–7)
WBC # BLD: 12.4 E9/L (ref 4.5–11.5)

## 2023-01-29 PROCEDURE — 6360000002 HC RX W HCPCS: Performed by: PHYSICIAN ASSISTANT

## 2023-01-29 PROCEDURE — 84484 ASSAY OF TROPONIN QUANT: CPT

## 2023-01-29 PROCEDURE — 96374 THER/PROPH/DIAG INJ IV PUSH: CPT

## 2023-01-29 PROCEDURE — 80053 COMPREHEN METABOLIC PANEL: CPT

## 2023-01-29 PROCEDURE — 6370000000 HC RX 637 (ALT 250 FOR IP): Performed by: PHYSICIAN ASSISTANT

## 2023-01-29 PROCEDURE — 73110 X-RAY EXAM OF WRIST: CPT

## 2023-01-29 PROCEDURE — 99285 EMERGENCY DEPT VISIT HI MDM: CPT

## 2023-01-29 PROCEDURE — 83880 ASSAY OF NATRIURETIC PEPTIDE: CPT

## 2023-01-29 PROCEDURE — 84550 ASSAY OF BLOOD/URIC ACID: CPT

## 2023-01-29 PROCEDURE — 85025 COMPLETE CBC W/AUTO DIFF WBC: CPT

## 2023-01-29 PROCEDURE — 36415 COLL VENOUS BLD VENIPUNCTURE: CPT

## 2023-01-29 PROCEDURE — 93005 ELECTROCARDIOGRAM TRACING: CPT | Performed by: PHYSICIAN ASSISTANT

## 2023-01-29 PROCEDURE — 71045 X-RAY EXAM CHEST 1 VIEW: CPT

## 2023-01-29 RX ORDER — DOXYCYCLINE HYCLATE 100 MG/1
100 CAPSULE ORAL ONCE
Status: COMPLETED | OUTPATIENT
Start: 2023-01-29 | End: 2023-01-29

## 2023-01-29 RX ORDER — BENZONATATE 100 MG/1
100 CAPSULE ORAL 3 TIMES DAILY PRN
Qty: 21 CAPSULE | Refills: 0 | Status: SHIPPED | OUTPATIENT
Start: 2023-01-29 | End: 2023-02-05

## 2023-01-29 RX ORDER — DOXYCYCLINE HYCLATE 100 MG
100 TABLET ORAL 2 TIMES DAILY
Qty: 20 TABLET | Refills: 0 | Status: SHIPPED | OUTPATIENT
Start: 2023-01-29 | End: 2023-02-08

## 2023-01-29 RX ORDER — TOBRAMYCIN 3 MG/ML
1 SOLUTION/ DROPS OPHTHALMIC EVERY 4 HOURS
Qty: 5 ML | Refills: 0 | Status: SHIPPED | OUTPATIENT
Start: 2023-01-29 | End: 2023-02-08

## 2023-01-29 RX ORDER — TOBRAMYCIN 3 MG/ML
2 SOLUTION/ DROPS OPHTHALMIC ONCE
Status: COMPLETED | OUTPATIENT
Start: 2023-01-29 | End: 2023-01-29

## 2023-01-29 RX ORDER — HYDROCODONE BITARTRATE AND ACETAMINOPHEN 5; 325 MG/1; MG/1
1 TABLET ORAL EVERY 6 HOURS PRN
Qty: 12 TABLET | Refills: 0 | Status: SHIPPED | OUTPATIENT
Start: 2023-01-29 | End: 2023-02-01

## 2023-01-29 RX ORDER — KETOROLAC TROMETHAMINE 30 MG/ML
15 INJECTION, SOLUTION INTRAMUSCULAR; INTRAVENOUS ONCE
Status: COMPLETED | OUTPATIENT
Start: 2023-01-29 | End: 2023-01-29

## 2023-01-29 RX ADMIN — DOXYCYCLINE HYCLATE 100 MG: 100 CAPSULE ORAL at 10:53

## 2023-01-29 RX ADMIN — TOBRAMYCIN 2 DROP: 3 SOLUTION/ DROPS OPHTHALMIC at 10:38

## 2023-01-29 RX ADMIN — KETOROLAC TROMETHAMINE 15 MG: 30 INJECTION, SOLUTION INTRAMUSCULAR at 08:50

## 2023-01-29 ASSESSMENT — PAIN SCALES - GENERAL: PAINLEVEL_OUTOF10: 0

## 2023-01-29 NOTE — ED PROVIDER NOTES
Independent ANGELICA Visit. HPI:  1/29/23, Time: 8:21 AM MARK Adamson is a 71 y.o. male presenting to the ED for cough right wrist pain, beginning 2 weeks ago. The complaint has been persistent, moderate in severity, and worsened by cough. This is a 73-year-old male who started with URI symptoms 2 weeks ago that are persistent with productive cough of yellow-green sputum some shortness of breath secondary to the congestion he denies any chest pain palpitations diaphoresis nausea. He did have a pacemaker placed on January 4 no erythema or drainage from the incisions present over the last 3 days he noticed increasing erythema pain to the right wrist denies any known injury. Denies any numbness tingling weakness present. No history of known gout. Review of Systems:   A complete review of systems was performed and pertinent positives and negatives are stated within HPI, all other systems reviewed and are negative.          --------------------------------------------- PAST HISTORY ---------------------------------------------  Past Medical History:  has a past medical history of Arthritis, Cancer ([facility] [address] Diabetes mellitus ([facility] [address] GERD (gastroesophageal reflux disease), Hypertension, Left carpal tunnel syndrome, Lymphoma ([facility] [address] Seasonal allergies, and Skin lesions, generalized. Past Surgical History:  has a past surgical history that includes other surgical history (07/19/2013); Colonoscopy; Carpal tunnel release (Right); Carpal tunnel release (Left, 12/29/2017); other surgical history (Left, 12/29/2017); Insertable Cardiac Monitor (11/22/2021); Pacemaker insertion (Left, 01/04/2023); and Pacemaker insertion (01/04/2023). Social History:  reports that he has never smoked. He has never used smokeless tobacco. He reports that he does not drink alcohol and does not use drugs.     Family History: family history includes Alzheimer's Disease in his father; Heart Disease in his mother; Heart Failure in his mother. The patients home medications have been reviewed.     Allergies: Pcn [penicillins] and Percocet [oxycodone-acetaminophen]    -------------------------------------------------- RESULTS -------------------------------------------------  All laboratory and radiology results have been personally reviewed by myself   LABS:  Results for orders placed or performed during the hospital encounter of 01/29/23   CBC with Auto Differential   Result Value Ref Range    WBC 12.4 (H) 4.5 - 11.5 E9/L    RBC 3.93 3.80 - 5.80 E12/L    Hemoglobin 12.2 (L) 12.5 - 16.5 g/dL    Hematocrit 35.3 (L) 37.0 - 54.0 %    MCV 89.8 80.0 - 99.9 fL    MCH 31.0 26.0 - 35.0 pg    MCHC 34.6 (H) 32.0 - 34.5 %    RDW 11.9 11.5 - 15.0 fL    Platelets 516 731 - 346 E9/L    MPV 9.5 7.0 - 12.0 fL    Neutrophils % 74.3 43.0 - 80.0 %    Immature Granulocytes % 0.2 0.0 - 5.0 %    Lymphocytes % 12.3 (L) 20.0 - 42.0 %    Monocytes % 12.2 (H) 2.0 - 12.0 %    Eosinophils % 0.7 0.0 - 6.0 %    Basophils % 0.3 0.0 - 2.0 %    Neutrophils Absolute 9.17 (H) 1.80 - 7.30 E9/L    Immature Granulocytes # 0.03 E9/L    Lymphocytes Absolute 1.52 1.50 - 4.00 E9/L    Monocytes Absolute 1.51 (H) 0.10 - 0.95 E9/L    Eosinophils Absolute 0.09 0.05 - 0.50 E9/L    Basophils Absolute 0.04 0.00 - 0.20 E9/L    Anisocytosis 1+     Polychromasia 1+     Hypochromia 1+    Comprehensive Metabolic Panel w/ Reflex to MG   Result Value Ref Range    Sodium 141 132 - 146 mmol/L    Potassium reflex Magnesium 4.4 3.5 - 5.0 mmol/L    Chloride 103 98 - 107 mmol/L    CO2 25 22 - 29 mmol/L    Anion Gap 13 7 - 16 mmol/L    Glucose 210 (H) 74 - 99 mg/dL    BUN 13 6 - 23 mg/dL    Creatinine 0.9 0.7 - 1.2 mg/dL    Est, Glom Filt Rate >60 >=60 mL/min/1.73    Calcium 9.8 8.6 - 10.2 mg/dL    Total Protein 7.6 6.4 - 8.3 g/dL    Albumin 4.1 3.5 - 5.2 g/dL    Total Bilirubin 0.8 0.0 - 1.2 mg/dL    Alkaline Phosphatase 82 40 - 129 U/L    ALT 6 0 - 40 U/L    AST 15 0 - 39 U/L   Troponin   Result Value Ref Range    Troponin, High Sensitivity 25 (H) 0 - 11 ng/L   Brain Natriuretic Peptide   Result Value Ref Range    Pro-BNP 71 0 - 125 pg/mL   Uric Acid   Result Value Ref Range    Uric Acid, Serum 8.0 (H) 3.4 - 7.0 mg/dL   Troponin   Result Value Ref Range    Troponin, High Sensitivity 24 (H) 0 - 11 ng/L   EKG 12 Lead   Result Value Ref Range    Ventricular Rate 84 BPM    Atrial Rate 84 BPM    P-R Interval 234 ms    QRS Duration 160 ms    Q-T Interval 410 ms    QTc Calculation (Bazett) 484 ms    P Axis 54 degrees    R Axis -53 degrees    T Axis -10 degrees       RADIOLOGY:  Interpreted by Radiologist.  XR WRIST RIGHT (MIN 3 VIEWS)   Final Result   1. Soft tissue swelling overlies medial aspect of the wrist.   2. No subcutaneous gas or radiopaque foreign body. 3. No evidence of acute fracture or dislocation. XR CHEST PORTABLE   Final Result   No acute process. ------------------------- NURSING NOTES AND VITALS REVIEWED ---------------------------   The nursing notes within the ED encounter and vital signs as below have been reviewed. BP (!) 175/83   Pulse 77   Temp 97.9 °F (36.6 °C)   Resp 18   SpO2 98%   Oxygen Saturation Interpretation: Normal      ---------------------------------------------------PHYSICAL EXAM--------------------------------------      Constitutional/General: Alert and oriented x3, well appearing, non toxic in NAD  Head: Normocephalic and atraumatic  Eyes: PERRL, EOMI left conjunctive with erythema there is a yellow-greenish discharge present  Mouth: Oropharynx clear, handling secretions, no trismus  Neck: Supple, full ROM,   Pulmonary: Lungs clear to auscultation bilaterally, no wheezes scattered rhonchi not in respiratory distress  Cardiovascular:  Regular rate and rhythm, no murmurs, gallops, or rubs. 2+ distal pulses  Abdomen: Soft, non tender, non distended,   Extremities: Moves all extremities x 4. Warm and well perfused no edema of lower extremities. Right wrist with swelling erythema tenderness of the lateral wrist.  Peers to be a small central area irritation possible insect bite  Skin: warm and dry without rash  Neurologic: GCS 15,  Psych: Normal Affect      ------------------------------ ED COURSE/MEDICAL DECISION MAKING----------------------  Medications   tobramycin (TOBREX) 0.3 % ophthalmic solution 2 drop (2 drops Ophthalmic Given 1/29/23 1038)   ketorolac (TORADOL) injection 15 mg (15 mg IntraVENous Given 1/29/23 0850)   doxycycline hyclate (VIBRAMYCIN) capsule 100 mg (100 mg Oral Given 1/29/23 1053)         ED COURSE:    EKG #1:  Interpreted by emergency department attending physician unless otherwise noted. 1/29/23  Time: 0839    Rhythm: normal sinus  and 1 degree AV block  Rate: 85  Axis: normal  Conduction: right bundle branch block (complete),bifasicular block  ST Segments: no acute change  T Waves: non specific changes    Clinical Impression: NSR, Normal ecg, 1st degree AV block  Comparison to Prior tracings:  Today's ECG is unchanged from previous tracings. Medical Decision Making  This is a 73-year-old male who started with URI symptoms 2 weeks ago that are persistent with productive cough of yellow-green sputum some shortness of breath secondary to the congestion he denies any chest pain palpitations diaphoresis nausea. He did have a pacemaker placed on January 4 no erythema or drainage from the incisions present over the last 3 days he noticed increasing erythema pain to the right wrist denies any known injury. Denies any numbness tingling weakness present. No history of known gout. Chest x-ray no pneumonia process who has had sinus congestion productive cough with yellow-green sputum patient will be placed on doxycycline he is to continue with his Omnicef he was noted to have a left conjunctivitis was placed on Tobrex ophthalmic drops patient's uric acid is elevated patient was given instructions regarding gout.   X-ray no gas formation present he has a history of diabetes was placed on any steroids. He also is presently on Eliquis was not placed on anti-inflammatories. Patient was discharged with Norco for pain. He is to follow-up with primary care 1 to 2 days any worsening symptoms return to the ER    Amount and/or Complexity of Data Reviewed  External Data Reviewed: labs, radiology and ECG. Labs: ordered. Decision-making details documented in ED Course. Radiology: ordered and independent interpretation performed. Decision-making details documented in ED Course. ECG/medicine tests: ordered and independent interpretation performed. Decision-making details documented in ED Course. Risk  Prescription drug management. Medical Decision Making    Patient presents to the ER for cough, right wrist pain swelling . Social Determinants include   Social Connections: Not on file    Social Determinants : None. Chronic conditions    Past Medical History:   Diagnosis Date    Arthritis     SHOULDERS,let thumb    Cancer ([facility] [address]     prostate 12/2016    Diabetes mellitus (HCC)     GERD (gastroesophageal reflux disease)     Hypertension     STABLE    Left carpal tunnel syndrome     Lymphoma (HCC)     Seasonal allergies     Skin lesions, generalized MULTIPLE    ARMS, BACK, KNEE, healed, presently no open wounds 5/4/17   . Physical exam Constitutional/General: Alert and oriented x3, well appearing, non toxic in NAD  Head: Normocephalic and atraumatic  Eyes: PERRL, EOMI left conjunctive with erythema there is a yellow-greenish discharge present  Mouth: Oropharynx clear, handling secretions, no trismus  Neck: Supple, full ROM,   Pulmonary: Lungs clear to auscultation bilaterally, no wheezes scattered rhonchi not in respiratory distress  Cardiovascular:  Regular rate and rhythm, no murmurs, gallops, or rubs. 2+ distal pulses  Abdomen: Soft, non tender, non distended,   Extremities: Moves all extremities x 4.  Warm and well perfused no edema of lower extremities. Right wrist with swelling erythema tenderness of the lateral wrist.  Peers to be a small central area irritation possible insect bite  Skin: warm and dry without rash  Neurologic: GCS 15,. Vital signs /83Temp   97.9 °F    (36.6 °C)  Heart Rate 77Resp 18SpO2 98%Wt --Ht   5' 9\"    (175.3 cm)  BMI 31.75 kg/m². Differential diagnoses include but not limited to acute MI, acute coronary disease pneumonia, COPD extubation, CHF, viral URI, cellulitis, gout diagnostic studies revealed chest x-ray x-ray of the wrist were interpreted by me final read by radiologist as no finding of pneumonia and no free. Consults included none. Results were discussed with patient. Patient was given Toradol 15 mg IV x1 for their parents present x-ray of the arm no fracture dislocation no gas formation symptoms with mild improvement. Patient will be discharged home with the following prescriptions, Tobrex 0.3% ophthalmic solution 2 drops left eye every 4 hours x7 days, Tessalon Perles 100 mg p.o. 3 times daily, doxycycline 1 tab twice daily for 10 days patient is to continue with Omnicef as previously prescribed Norco 5/325 mg every 6 hours as needed for 3 da. Discussed appropriate use and potential side effects of starting the prescribed medications. Patient continues to be non-toxic on re-evaluation. Findings were discussed with the patient and reasons to immediately return to the ED were articulated to them.  They will follow-up with their PMD .      Discharge Instructions:   Patient referred to  Mary Friedman DO  [address] [address]  [phone]    Call in 2 days      MEDICATIONS:   DISCHARGE MEDICATIONS:  Discharge Medication List as of 1/29/2023 10:53 AM        START taking these medications    Details   doxycycline hyclate (VIBRA-TABS) 100 MG tablet Take 1 tablet by mouth 2 times daily for 10 days, Disp-20 tablet, R-0Normal      benzonatate (TESSALON PERLES) 100 MG capsule Take 1 capsule by mouth 3 times daily as needed for Cough, Disp-21 capsule, R-0Normal      tobramycin (TOBREX) 0.3 % ophthalmic solution Place 1 drop into the left eye every 4 hours for 10 days, Disp-5 mL, R-0Normal      HYDROcodone-acetaminophen (NORCO) 5-325 MG per tablet Take 1 tablet by mouth every 6 hours as needed for Pain for up to 3 days. Intended supply: 3 days. Take lowest dose possible to manage pain Max Daily Amount: 4 tablets, Disp-12 tablet, R-0Normal             DISCONTINUED MEDICATIONS:  Discharge Medication List as of 1/29/2023 10:53 AM          Record Review:  Records Reviewed : Source recent [facility] encounters       Disposition Considerations: This patient's ED course included: a personal history and physicial examination and re-evaluation prior to disposition  This patient has remained hemodynamically stable during their ED course. I emphasized the importance of follow-up with the physician I referred them to in the timeframe recommended. I discussed with the patient emergent symptoms and the need to immediately return to the ER. Written information was included in their discharge instructions. Additional verbal discharge instructions were also given and discussed with the patient to supplement those generated by the EMR. We also discussed medications that were prescribed  (if any) including common side effects and interactions. The patient was advised to abstain from driving, operating heavy machinery or making significant decisions while taking medications such as opiates and muscle relaxers that may impair this. All questions were addressed. They understand return precautions and discharge instructions. The patient  expressed understanding. Vitals were stable and they were in no distress at discharge. Counseling:    The emergency provider has spoken with the patient and discussed todays results, in addition to providing specific details for the plan of care and counseling regarding the diagnosis and prognosis. Questions are answered at this time and they are agreeable with the plan.      --------------------------------- IMPRESSION AND DISPOSITION ---------------------------------    IMPRESSION  1. Other mucopurulent conjunctivitis of left eye    2. Idiopathic gout, unspecified chronicity, unspecified site    3. Sinobronchitis        DISPOSITION  Disposition: Discharge to home  Patient condition is good      NOTE: This report was transcribed using voice recognition software.  Every effort was made to ensure accuracy; however, inadvertent computerized transcription errors may be present      Harris Severe, [place]  01/29/23 1147

## 2023-01-30 LAB
EKG ATRIAL RATE: 84 BPM
EKG P AXIS: 54 DEGREES
EKG P-R INTERVAL: 234 MS
EKG Q-T INTERVAL: 410 MS
EKG QRS DURATION: 160 MS
EKG QTC CALCULATION (BAZETT): 484 MS
EKG R AXIS: -53 DEGREES
EKG T AXIS: -10 DEGREES
EKG VENTRICULAR RATE: 84 BPM

## 2023-01-30 PROCEDURE — 93010 ELECTROCARDIOGRAM REPORT: CPT | Performed by: INTERNAL MEDICINE

## 2023-02-28 ENCOUNTER — OFFICE VISIT (OUTPATIENT)
Dept: ORTHOPEDIC SURGERY | Age: 73
End: 2023-02-28
Payer: MEDICARE

## 2023-02-28 VITALS — HEIGHT: 70 IN | BODY MASS INDEX: 29.35 KG/M2 | WEIGHT: 205 LBS

## 2023-02-28 DIAGNOSIS — M18.11 ARTHRITIS OF CARPOMETACARPAL (CMC) JOINT OF RIGHT THUMB: Primary | ICD-10-CM

## 2023-02-28 PROCEDURE — 1036F TOBACCO NON-USER: CPT | Performed by: ORTHOPAEDIC SURGERY

## 2023-02-28 PROCEDURE — 3017F COLORECTAL CA SCREEN DOC REV: CPT | Performed by: ORTHOPAEDIC SURGERY

## 2023-02-28 PROCEDURE — G8484 FLU IMMUNIZE NO ADMIN: HCPCS | Performed by: ORTHOPAEDIC SURGERY

## 2023-02-28 PROCEDURE — 1123F ACP DISCUSS/DSCN MKR DOCD: CPT | Performed by: ORTHOPAEDIC SURGERY

## 2023-02-28 PROCEDURE — 99214 OFFICE O/P EST MOD 30 MIN: CPT | Performed by: ORTHOPAEDIC SURGERY

## 2023-02-28 PROCEDURE — G8417 CALC BMI ABV UP PARAM F/U: HCPCS | Performed by: ORTHOPAEDIC SURGERY

## 2023-02-28 PROCEDURE — G8427 DOCREV CUR MEDS BY ELIG CLIN: HCPCS | Performed by: ORTHOPAEDIC SURGERY

## 2023-05-22 ENCOUNTER — TELEPHONE (OUTPATIENT)
Dept: NON INVASIVE DIAGNOSTICS | Age: 73
End: 2023-05-22

## 2023-05-22 NOTE — TELEPHONE ENCOUNTER
Patient called in to device clinic, stating that this AM he fell out of bed. He was wondering if his device may have captured or transmitted any issues. Informed him that per [employer] there were no transmissions from his device. Instructed him to send a transmission when he gets home.

## 2023-05-23 ENCOUNTER — TELEPHONE (OUTPATIENT)
Dept: NON INVASIVE DIAGNOSTICS | Age: 73
End: 2023-05-23

## 2023-05-23 NOTE — TELEPHONE ENCOUNTER
Patient called to let Dr Shara Vines know he fell out of bed on 5/21/23 and states he fell on his device side. Patient complains of pain at device site, especially when he takes a deep breath. Patient denies hitting his head and states it was early morning. Denies redness or swelling at device site. Patient made an appt for 5/25/23 for device check while there is a provider in office.

## 2023-05-25 ENCOUNTER — NURSE ONLY (OUTPATIENT)
Dept: NON INVASIVE DIAGNOSTICS | Age: 73
End: 2023-05-25
Payer: MEDICARE

## 2023-05-25 DIAGNOSIS — I44.2 COMPLETE AV BLOCK (HCC): Primary | ICD-10-CM

## 2023-05-25 DIAGNOSIS — Z95.0 PRESENCE OF CARDIAC PACEMAKER: ICD-10-CM

## 2023-05-25 PROCEDURE — 93280 PM DEVICE PROGR EVAL DUAL: CPT | Performed by: INTERNAL MEDICINE

## 2023-06-27 ENCOUNTER — TELEPHONE (OUTPATIENT)
Dept: NON INVASIVE DIAGNOSTICS | Age: 73
End: 2023-06-27

## 2023-07-06 ENCOUNTER — TELEPHONE (OUTPATIENT)
Dept: NON INVASIVE DIAGNOSTICS | Age: 73
End: 2023-07-06

## 2023-07-06 ENCOUNTER — HOSPITAL ENCOUNTER (OUTPATIENT)
Dept: PHARMACY | Age: 73
Setting detail: THERAPIES SERIES
Discharge: HOME | End: 2023-07-06
Payer: MEDICARE

## 2023-07-06 VITALS
SYSTOLIC BLOOD PRESSURE: 159 MMHG | BODY MASS INDEX: 30.56 KG/M2 | DIASTOLIC BLOOD PRESSURE: 87 MMHG | HEART RATE: 73 BPM | WEIGHT: 213 LBS

## 2023-07-06 DIAGNOSIS — I48.0 PAROXYSMAL ATRIAL FIBRILLATION (HCC): Primary | ICD-10-CM

## 2023-07-06 LAB — INTERNATIONAL NORMALIZATION RATIO, POC: 1.2

## 2023-07-06 PROCEDURE — 85610 PROTHROMBIN TIME: CPT

## 2023-07-06 PROCEDURE — 99203 OFFICE O/P NEW LOW 30 MIN: CPT

## 2023-07-06 NOTE — PROGRESS NOTES
[facility] Patient Encounter    Patient Findings       Positives:  Change in medications (MED REC COMPLETED)    Negatives:  Signs/symptoms of thrombosis, Signs/symptoms of bleeding, Laboratory test error suspected, Change in health, Change in alcohol use, Change in activity, Upcoming invasive procedure, Emergency department visit, Upcoming dental procedure, Missed doses, Extra doses, Change in diet/appetite, Hospital admission, Bruising, Other complaints           Patient  reports that he has never smoked. He has never used smokeless tobacco.     Assessment/Plan: This is patient's first visit to the [address] [place] [address] (Medication Management). The patient was provided with all standard clinic warfarin education (MOA, s/s bleeding, INR monitoring, compliance, drug/food/substance interactions, clinic procedures). The patient was provided with the following supplemental printed handouts: Warfarin Information, Vitamin K Content, and When to Contact the Anticoagulation Clinic. Magdaleno Macario is on warfarin for atrial fibrillation, with a goal INR of 2-3. Patient's [id number] score:  Atrial Fibrillation CHADSVASC2 Score Stroke Risk:   71 y.o. 77-78 - 1   male Male - 0   CHF HX: No - 0   HTN HX: Yes - 1   Stroke/TIA/Thromboembolism No - 0   Vascular Disease HX: No - 0   Diabetes Mellitus Yes - 1   CHADSVASC 2 Score 3      Annual Stroke Risk 3.2% - moderate-high      Patient has been on warfarin since 7/3/23. Current warfarin dose is 5 mg daily. He is also on Eliquis 5 mg bid. INR is subtherapeutic at 1.2, goal 2-3.    Warfarin Dose: Increase to 7.5 mg daily and continue Eliquis 5 mg bid  Follow Up: 7/10/23    CHERYL Rodriguez [facility] PharmD 7/6/2023 11:09 AM  For Pharmacy Admin Tracking Only    Intervention Detail: Dose Adjustment: 1, reason: Therapy Optimization  Total # of Interventions Recommended: 1  Total # of Interventions Accepted:

## 2023-07-10 ENCOUNTER — HOSPITAL ENCOUNTER (OUTPATIENT)
Dept: PHARMACY | Age: 73
Setting detail: THERAPIES SERIES
Discharge: HOME | End: 2023-07-10
Payer: MEDICARE

## 2023-07-10 VITALS — SYSTOLIC BLOOD PRESSURE: 161 MMHG | HEART RATE: 71 BPM | DIASTOLIC BLOOD PRESSURE: 91 MMHG

## 2023-07-10 DIAGNOSIS — I48.0 PAROXYSMAL ATRIAL FIBRILLATION (HCC): Primary | ICD-10-CM

## 2023-07-10 LAB — INTERNATIONAL NORMALIZATION RATIO, POC: 2

## 2023-07-10 PROCEDURE — 99212 OFFICE O/P EST SF 10 MIN: CPT

## 2023-07-10 PROCEDURE — 85610 PROTHROMBIN TIME: CPT

## 2023-07-10 NOTE — PROGRESS NOTES
[address] [facility]  Patient Findings       Positives:  Change in diet/appetite (BROCCOLI, SALAD SINCE LAST VISIT)    Negatives:  Signs/symptoms of thrombosis, Signs/symptoms of bleeding, Laboratory test error suspected, Change in health, Change in alcohol use, Change in activity, Upcoming invasive procedure, Emergency department visit, Upcoming dental procedure, Missed doses, Extra doses, Change in medications, Hospital admission, Bruising, Other complaints           Assessment/Plan:  Warfarin indication: atrial fibrillation      INR today is therapeutic at 2.0, goal is 2-3. THIS IS FIRST DAY OF THERAPEUTIC INR, HE IS ON DAY 7 OF WARFARIN.     Warfarin Dose: 7.5 MG TODAY, 5 MG TUES./WED.; STOP ELIQUIS    Follow Up: 3 days      Mary Figueroa [facility] PharmD 7/10/2023 4:16 PM  For Pharmacy Admin Tracking Only    Intervention Detail: Dose Adjustment: 1, reason: Therapy Optimization  Total # of Interventions Recommended: 1  Total # of Interventions Accepted: 1  Time Spent (min): 20

## 2023-07-13 ENCOUNTER — HOSPITAL ENCOUNTER (OUTPATIENT)
Dept: PHARMACY | Age: 73
Setting detail: THERAPIES SERIES
Discharge: HOME | End: 2023-07-13
Payer: MEDICARE

## 2023-07-13 VITALS
BODY MASS INDEX: 30.33 KG/M2 | SYSTOLIC BLOOD PRESSURE: 164 MMHG | HEART RATE: 70 BPM | DIASTOLIC BLOOD PRESSURE: 80 MMHG | WEIGHT: 211.4 LBS

## 2023-07-13 DIAGNOSIS — I48.0 PAROXYSMAL ATRIAL FIBRILLATION (HCC): Primary | ICD-10-CM

## 2023-07-13 LAB — INTERNATIONAL NORMALIZATION RATIO, POC: 2.4

## 2023-07-13 PROCEDURE — 85610 PROTHROMBIN TIME: CPT

## 2023-07-13 NOTE — PROGRESS NOTES
[address] [facility]  Patient Findings       Positives:  Change in diet/appetite (LITTLE BROCCOLI AND STRING BEANS THIS WEEK)    Negatives:  Signs/symptoms of thrombosis, Signs/symptoms of bleeding, Laboratory test error suspected, Change in health, Change in alcohol use, Change in activity, Upcoming invasive procedure, Emergency department visit, Upcoming dental procedure, Missed doses, Extra doses, Change in medications, Hospital admission, Bruising, Other complaints           Assessment/Plan:  Warfarin indication: atrial fibrillation      INR today is therapeutic at 2.4, goal is 2-3. THIS IS DAY #10 OF WARFARIN THERAPY. Warfarin Dose: HE HAD 47.5 MG IN THE PAST WEEK, PATIENT NOT AT STEADY-STATE YET.  WILL ADJUST DOSE TO 7.5 MG M/W/F; 5 MG ALL OTHER DAYS    Follow Up: 1 week      CHERYL Wilkins BRENDA [facility] PharmD 7/13/2023 11:28 AM  For Pharmacy Admin Tracking Only    Intervention Detail: Dose Adjustment: 1, reason: Therapy Optimization  Total # of Interventions Recommended: 1  Total # of Interventions Accepted: 1  Time Spent (min): 15

## 2023-07-14 ENCOUNTER — TELEPHONE (OUTPATIENT)
Dept: NON INVASIVE DIAGNOSTICS | Age: 73
End: 2023-07-14

## 2023-07-14 NOTE — TELEPHONE ENCOUNTER
Patient can't afford Eliquis and was denied through prescription assistance due to income. He will remain on coumadin.      Electronically signed by Oscar Yost MA on 7/14/2023 at 10:56 AM

## 2023-07-20 ENCOUNTER — HOSPITAL ENCOUNTER (OUTPATIENT)
Dept: PHARMACY | Age: 73
Setting detail: THERAPIES SERIES
Discharge: HOME | End: 2023-07-20
Payer: MEDICARE

## 2023-07-20 VITALS
DIASTOLIC BLOOD PRESSURE: 83 MMHG | BODY MASS INDEX: 30.28 KG/M2 | WEIGHT: 211 LBS | SYSTOLIC BLOOD PRESSURE: 152 MMHG | HEART RATE: 76 BPM

## 2023-07-20 DIAGNOSIS — I48.0 PAROXYSMAL ATRIAL FIBRILLATION (HCC): Primary | ICD-10-CM

## 2023-07-20 LAB — INTERNATIONAL NORMALIZATION RATIO, POC: 2.6

## 2023-07-20 PROCEDURE — 85610 PROTHROMBIN TIME: CPT

## 2023-07-20 NOTE — PROGRESS NOTES
[address] [facility]  Patient Findings       Positives:  Change in diet/appetite (LITTLE VITAMIN K, DIET HAS BEEN USUAL FOR HIM), Other complaints (REPORTS MORE STRESS IN HIS LIFE THIS PAST WEEK)    Negatives:  Signs/symptoms of thrombosis, Signs/symptoms of bleeding, Laboratory test error suspected, Change in health, Change in alcohol use, Change in activity, Upcoming invasive procedure, Emergency department visit, Upcoming dental procedure, Missed doses, Extra doses, Change in medications, Hospital admission, Bruising           Assessment/Plan:  Warfarin indication: atrial fibrillation      INR today is therapeutic at 2.6, goal is 2-3. INR THERAPEUTIC X 2 CONSECUTIVE WEEKS SINCE STARTING WARFARIN ABOUT 3 WEEKS AGO.     Warfarin Dose: same (7.5 mg every M/W/F; 5 mg all other days)    Follow Up: 2 weeks      CHERYL Weston BRENDA [facility] PharmD 7/20/2023 11:27 AM  For Pharmacy Admin Tracking Only    Intervention Detail:   Total # of Interventions Recommended: 0  Total # of Interventions Accepted: 0  Time Spent (min): 15

## 2023-08-03 ENCOUNTER — HOSPITAL ENCOUNTER (OUTPATIENT)
Dept: PHARMACY | Age: 73
Setting detail: THERAPIES SERIES
Discharge: HOME | End: 2023-08-03
Payer: MEDICARE

## 2023-08-03 VITALS
HEART RATE: 72 BPM | WEIGHT: 214.8 LBS | SYSTOLIC BLOOD PRESSURE: 171 MMHG | BODY MASS INDEX: 30.82 KG/M2 | DIASTOLIC BLOOD PRESSURE: 83 MMHG

## 2023-08-03 DIAGNOSIS — I48.0 PAROXYSMAL ATRIAL FIBRILLATION (HCC): Primary | ICD-10-CM

## 2023-08-03 LAB — INTERNATIONAL NORMALIZATION RATIO, POC: 2.9

## 2023-08-03 PROCEDURE — 99211 OFF/OP EST MAY X REQ PHY/QHP: CPT

## 2023-08-03 PROCEDURE — 85610 PROTHROMBIN TIME: CPT

## 2023-08-03 NOTE — PROGRESS NOTES
[facility]  Patient Findings       Negatives:  Signs/symptoms of thrombosis, Signs/symptoms of bleeding, Laboratory test error suspected, Change in health, Change in alcohol use, Change in activity, Upcoming invasive procedure, Emergency department visit, Upcoming dental procedure, Missed doses, Extra doses, Change in medications, Change in diet/appetite, Hospital admission, Bruising, Other complaints           Assessment/Plan:  Warfarin indication: atrial fibrillation      INR today is therapeutic at 2.9, goal is 2-3. THIS IS HIS 3RD CONSECUTIVE THERAPEUTIC INR SINCE STARTING WARFARIN ABOUT A MONTH AGO.      Warfarin Dose: same (7.5 mg every M/W/F; 5 mg all other days)    Follow Up: 3 weeks      CHERYL Juan [facility] PharmD 8/3/2023 10:22 AM  For Pharmacy Admin Tracking Only    Intervention Detail:   Total # of Interventions Recommended: 0  Total # of Interventions Accepted: 0  Time Spent (min): 15

## 2023-08-24 ENCOUNTER — HOSPITAL ENCOUNTER (OUTPATIENT)
Dept: PHARMACY | Age: 73
Setting detail: THERAPIES SERIES
Discharge: HOME | End: 2023-08-24
Payer: MEDICARE

## 2023-08-24 VITALS
WEIGHT: 217.4 LBS | DIASTOLIC BLOOD PRESSURE: 83 MMHG | BODY MASS INDEX: 31.19 KG/M2 | SYSTOLIC BLOOD PRESSURE: 166 MMHG | HEART RATE: 76 BPM

## 2023-08-24 DIAGNOSIS — I48.0 PAROXYSMAL ATRIAL FIBRILLATION (HCC): Primary | ICD-10-CM

## 2023-08-24 LAB — INR BLD: 3.6

## 2023-08-24 PROCEDURE — 85610 PROTHROMBIN TIME: CPT

## 2023-08-24 PROCEDURE — 99212 OFFICE O/P EST SF 10 MIN: CPT

## 2023-09-11 ENCOUNTER — HOSPITAL ENCOUNTER (OUTPATIENT)
Dept: PHARMACY | Age: 73
Setting detail: THERAPIES SERIES
Discharge: HOME | End: 2023-09-11
Payer: MEDICARE

## 2023-09-11 VITALS
BODY MASS INDEX: 31.05 KG/M2 | WEIGHT: 216.4 LBS | DIASTOLIC BLOOD PRESSURE: 76 MMHG | SYSTOLIC BLOOD PRESSURE: 150 MMHG | HEART RATE: 63 BPM

## 2023-09-11 DIAGNOSIS — I48.0 PAROXYSMAL ATRIAL FIBRILLATION (HCC): Primary | ICD-10-CM

## 2023-09-11 LAB — INTERNATIONAL NORMALIZATION RATIO, POC: 2.1

## 2023-09-11 PROCEDURE — 99211 OFF/OP EST MAY X REQ PHY/QHP: CPT

## 2023-09-11 PROCEDURE — 85610 PROTHROMBIN TIME: CPT

## 2023-09-11 NOTE — PROGRESS NOTES
[address] (Medication Management)  [address] [place], [place], [address]  Phone: [phone]    Face-To-Face Visit  Patient Findings       Negatives:  Signs/symptoms of thrombosis, Signs/symptoms of bleeding, Laboratory test error suspected, Change in health, Change in alcohol use, Change in activity, Upcoming invasive procedure, Emergency department visit, Upcoming dental procedure, Missed doses, Extra doses, Change in medications, Change in diet/appetite, Hospital admission, Bruising, Other complaints    Comments:  9/28: PODIATRY APPT. UROLOGY APPT. IN OCT. Assessment/Plan:  Warfarin indication: atrial fibrillation      INR today is therapeutic at 2.1, goal is 2-3.     Warfarin Dose: same (7.5 mg every Mon./Fri.; 5 mg all other days)    Follow Up: 2 weeks      Nabor Sung [facility] PharmD 9/11/2023 1:07 PM    For Pharmacy Admin Tracking Only    Intervention Detail:   Total # of Interventions Recommended: 0  Total # of Interventions Accepted: 0  Time Spent (min): 15

## 2023-09-27 RX ORDER — WARFARIN SODIUM 5 MG/1
TABLET ORAL
Qty: 40 TABLET | Refills: 11 | Status: SHIPPED | OUTPATIENT
Start: 2023-09-27

## 2023-10-02 ENCOUNTER — HOSPITAL ENCOUNTER (OUTPATIENT)
Dept: PHARMACY | Age: 73
Setting detail: THERAPIES SERIES
Discharge: HOME | End: 2023-10-02
Payer: MEDICARE

## 2023-10-02 VITALS
SYSTOLIC BLOOD PRESSURE: 160 MMHG | HEART RATE: 61 BPM | WEIGHT: 220.4 LBS | DIASTOLIC BLOOD PRESSURE: 84 MMHG | BODY MASS INDEX: 31.62 KG/M2

## 2023-10-02 DIAGNOSIS — I48.0 PAROXYSMAL ATRIAL FIBRILLATION (HCC): Primary | ICD-10-CM

## 2023-10-02 LAB — INTERNATIONAL NORMALIZATION RATIO, POC: 2.3

## 2023-10-02 PROCEDURE — 85610 PROTHROMBIN TIME: CPT

## 2023-10-02 PROCEDURE — 99211 OFF/OP EST MAY X REQ PHY/QHP: CPT

## 2023-10-02 NOTE — PROGRESS NOTES
[address] (Medication Management)  [address] [place], [place], [address]  Phone: [phone]    Face-To-Face Visit  Patient Findings       Positives:  Missed doses (HE MISSED 5 MG ON 9/27)    Negatives:  Signs/symptoms of thrombosis, Signs/symptoms of bleeding, Laboratory test error suspected, Change in health, Change in alcohol use, Change in activity, Upcoming invasive procedure, Emergency department visit, Upcoming dental procedure, Extra doses, Change in medications, Change in diet/appetite, Hospital admission, Bruising, Other complaints    Comments:  UROLOGY APPT. OCT. 25TH           Assessment/Plan:  Warfarin indication: atrial fibrillation      INR today is therapeutic at 2.3, goal is 2-3.     Warfarin Dose: same (7.5 mg every Mon./Fri.; 5 mg all other days)    Follow Up: 4 weeks      Shania Dior [facility] PharmD 10/2/2023 11:25 AM    For Pharmacy Admin Tracking Only    Intervention Detail:   Total # of Interventions Recommended: 0  Total # of Interventions Accepted: 0  Time Spent (min): 15

## 2023-10-30 ENCOUNTER — HOSPITAL ENCOUNTER (OUTPATIENT)
Dept: PHARMACY | Age: 73
Setting detail: THERAPIES SERIES
Discharge: HOME | End: 2023-10-30
Payer: MEDICARE

## 2023-10-30 VITALS
WEIGHT: 221.2 LBS | DIASTOLIC BLOOD PRESSURE: 81 MMHG | HEART RATE: 62 BPM | SYSTOLIC BLOOD PRESSURE: 158 MMHG | BODY MASS INDEX: 31.74 KG/M2

## 2023-10-30 DIAGNOSIS — I48.0 PAROXYSMAL ATRIAL FIBRILLATION (HCC): Primary | ICD-10-CM

## 2023-10-30 LAB — INTERNATIONAL NORMALIZATION RATIO, POC: 2.7

## 2023-10-30 PROCEDURE — 85610 PROTHROMBIN TIME: CPT

## 2023-10-30 PROCEDURE — 99211 OFF/OP EST MAY X REQ PHY/QHP: CPT

## 2023-10-30 NOTE — PROGRESS NOTES
[address] (Medication Management)  [address] [place], [place], [address]  Phone: [phone]    Face-To-Face Visit  Patient Findings       Negatives:  Signs/symptoms of thrombosis, Signs/symptoms of bleeding, Laboratory test error suspected, Change in health, Change in alcohol use, Change in activity, Upcoming invasive procedure, Emergency department visit, Upcoming dental procedure, Missed doses, Extra doses, Change in medications, Change in diet/appetite, Hospital admission, Bruising, Other complaints    Comments:  PCP IN NOV. Assessment/Plan:  Warfarin indication: atrial fibrillation      INR today is therapeutic at 2.7, goal is 2-3. Warfarin Dose: same (7.5 mg every Mon./Fri.; 5 mg all other days)    Follow Up: 4 weeks    HE WILL ASK PCP AND CARDIO ABOUT ELEVATED BP'S.     CHERYL Hidalgo [facility] PharmD 10/30/2023 11:31 AM    For Pharmacy Admin Tracking Only    Intervention Detail:   Total # of Interventions Recommended: 0  Total # of Interventions Accepted: 0  Time Spent (min): 15

## 2023-11-20 NOTE — PROGRESS NOTES
[address] [facility] Electrophysiology  Outpatient Progress Note  Rodney Saleem Sr  1950  Date of Service: 11/21/2023  PCP: Mukund Feliciano DO  Electrophysiologist: Dr. Moe Pratt         Subjective: Esha Kirby is seen for follow-up and management of: PAF on flecainide and pacemaker    Last seen in the office with Dr. Moe Pratt on 9/27/21   Pacemaker placement on 1/4/23     PMH as noted below significant for PAF, palpitations, CHB, HTN, RBBB, GERD, OA a PAF and pacemaker and lymphoma. He originally had ILR insertion on 11/22/2021. He was found to have symptomatic intermittent complete AV block on 11/29/2022 with asystole of 10 seconds in December 2022. He underwent dual-chamber pacemaker implant on 1/4/2023. He has managed on flecainide and warfarin and denies any bleeding issues. Pacemaker check today shows no arrhythmias. He feels overall good. He does complain of some swelling in his right leg, but no complaints of palpitations, lightheadedness, dizziness, near-syncope or syncope.       Patient Active Problem List   Diagnosis    Chest pain    RBBB (right bundle branch block)    HTN (hypertension)    OA (osteoarthritis)    GERD (gastroesophageal reflux disease)    Valvular heart disease    Palpitations    Paroxysmal atrial fibrillation (HCC)    Lymphoma (HCC)    HB (heart block)    Complete AV block (HCC)    Presence of cardiac pacemaker    AVB (atrioventricular block)       Current Outpatient Medications   Medication Sig Dispense Refill    warfarin (COUMADIN) 5 MG tablet Take 1 or 1.5 tablets by mouth once daily in the evening as directed by Anticoagulation Clinic 40 tablet 11    metoprolol succinate (TOPROL XL) 50 MG extended release tablet TAKE 1 TABLET BY MOUTH DAILY 90 tablet 3    glipiZIDE (GLUCOTROL) 5 MG tablet TAKE 1 TABLET BY MOUTH EVERY DAY      flecainide (TAMBOCOR) 100 MG tablet TAKE 1 TABLET TWICE A  tablet 1    pantoprazole

## 2023-11-21 ENCOUNTER — OFFICE VISIT (OUTPATIENT)
Dept: NON INVASIVE DIAGNOSTICS | Age: 73
End: 2023-11-21
Payer: MEDICARE

## 2023-11-21 VITALS
BODY MASS INDEX: 31.78 KG/M2 | HEART RATE: 70 BPM | WEIGHT: 222 LBS | DIASTOLIC BLOOD PRESSURE: 90 MMHG | SYSTOLIC BLOOD PRESSURE: 150 MMHG | HEIGHT: 70 IN

## 2023-11-21 DIAGNOSIS — Z51.81 ENCOUNTER FOR MONITORING FLECAINIDE THERAPY: ICD-10-CM

## 2023-11-21 DIAGNOSIS — I45.10 RBBB (RIGHT BUNDLE BRANCH BLOCK): ICD-10-CM

## 2023-11-21 DIAGNOSIS — Z95.0 PACEMAKER: ICD-10-CM

## 2023-11-21 DIAGNOSIS — I44.2 COMPLETE AV BLOCK (HCC): ICD-10-CM

## 2023-11-21 DIAGNOSIS — Z79.899 ENCOUNTER FOR MONITORING FLECAINIDE THERAPY: ICD-10-CM

## 2023-11-21 DIAGNOSIS — I48.0 PAF (PAROXYSMAL ATRIAL FIBRILLATION) (HCC): Primary | ICD-10-CM

## 2023-11-21 PROCEDURE — 93280 PM DEVICE PROGR EVAL DUAL: CPT | Performed by: INTERNAL MEDICINE

## 2023-11-30 ENCOUNTER — HOSPITAL ENCOUNTER (OUTPATIENT)
Dept: PHARMACY | Age: 73
Setting detail: THERAPIES SERIES
Discharge: HOME | End: 2023-11-30
Payer: MEDICARE

## 2023-11-30 VITALS
HEART RATE: 72 BPM | WEIGHT: 220.8 LBS | DIASTOLIC BLOOD PRESSURE: 74 MMHG | BODY MASS INDEX: 31.68 KG/M2 | SYSTOLIC BLOOD PRESSURE: 143 MMHG

## 2023-11-30 DIAGNOSIS — I48.0 PAROXYSMAL ATRIAL FIBRILLATION (HCC): Primary | ICD-10-CM

## 2023-11-30 LAB — INTERNATIONAL NORMALIZATION RATIO, POC: 3.3

## 2023-11-30 PROCEDURE — 99212 OFFICE O/P EST SF 10 MIN: CPT

## 2023-11-30 PROCEDURE — 85610 PROTHROMBIN TIME: CPT

## 2023-11-30 NOTE — PROGRESS NOTES
[address] (Medication Management)  [address] [facility], [address]  Phone: [phone]    Face-To-Face Visit  Patient Findings       Positives:  Change in medications (CLINDAMYCIN WILL START TODAY FOR RECENT DENTAL PROCEDURE (IMPLANT REMOVED))    Negatives:  Signs/symptoms of thrombosis, Signs/symptoms of bleeding, Laboratory test error suspected, Change in health, Change in alcohol use, Change in activity, Upcoming invasive procedure, Emergency department visit, Upcoming dental procedure, Missed doses, Extra doses, Change in diet/appetite, Hospital admission, Bruising, Other complaints           Assessment/Plan:  Warfarin indication: atrial fibrillation      INR today is SUPRAtherapeutic at 3.3, goal is 2-3. NO REASON FOUND FOR HIGH INR.     Warfarin Dose: HOLD TONIGHT, THEN RESUME SAME REGIMEN: 7.5 MG EVERY MON./FRI.; 5 MG ALL OTHER DAYS    Follow Up: 3 weeks      CHERYL Jaime [facility] PharmD 11/30/2023 1:30 PM    For Pharmacy Admin Tracking Only    Intervention Detail: Dose Adjustment: 1, reason: Therapy De-escalation  Total # of Interventions Recommended: 1  Total # of Interventions Accepted: 1  Time Spent (min): 15

## 2023-12-28 ENCOUNTER — HOSPITAL ENCOUNTER (OUTPATIENT)
Dept: PHARMACY | Age: 73
Setting detail: THERAPIES SERIES
Discharge: HOME | End: 2023-12-28
Payer: MEDICARE

## 2023-12-28 VITALS
SYSTOLIC BLOOD PRESSURE: 166 MMHG | DIASTOLIC BLOOD PRESSURE: 83 MMHG | WEIGHT: 223 LBS | HEART RATE: 68 BPM | BODY MASS INDEX: 32 KG/M2

## 2023-12-28 DIAGNOSIS — I48.0 PAROXYSMAL ATRIAL FIBRILLATION (HCC): Primary | ICD-10-CM

## 2023-12-28 LAB — INTERNATIONAL NORMALIZATION RATIO, POC: 3.1

## 2023-12-28 PROCEDURE — 85610 PROTHROMBIN TIME: CPT

## 2023-12-28 PROCEDURE — 99211 OFF/OP EST MAY X REQ PHY/QHP: CPT

## 2023-12-28 NOTE — PROGRESS NOTES
[address] (Medication Management)  [address] [place], [place], [address]  Phone: [phone]    Face-To-Face Visit  Patient Findings       Positives: Other complaints (VERY STRESSED ABOUT HIS WIFE (SICK, MANY MEDICAL BILLS))    Negatives:  Signs/symptoms of thrombosis, Signs/symptoms of bleeding, Laboratory test error suspected, Change in health, Change in alcohol use, Change in activity, Upcoming invasive procedure, Emergency department visit, Upcoming dental procedure, Missed doses, Extra doses, Change in medications, Change in diet/appetite, Hospital admission, Bruising           Assessment/Plan:  Warfarin indication: atrial fibrillation      INR today is SUPRAtherapeutic at 3.1, goal is 2-3.     Warfarin Dose: WILL RETRY SAME DOSE ONCE MORE SINCE INR IS CLOSE TO GOAL (7.5 MG EVERY MON./FRI.; 5 MG ALL OTHER DAYS)    Follow Up: 4 weeks      Yamile oSlis, [facility] PharmD, BCACP 12/28/2023 1:35 PM    For Pharmacy Admin Tracking Only    Intervention Detail:   Total # of Interventions Recommended: 0  Total # of Interventions Accepted: 0  Time Spent (min): 15 [address] [facility] [address]  Phone: [phone] Fax [phone]  Vanessa Sim MD  9/28/2022         Ms. Emy Lewis  is a 69y.o.  year old  female patient who comes in to check on diabetes, hypertension, and high cholesterol. She has been seeing endocrinology for her diabetes and doing much better as far as her diabetes, by adding Trulicity. They are adding Farxiga to her regimen because her A1c was 7.6 but she has not started it yet. She is not taking Januvia anymore because of the cost but it did help her sugars. She stopped taking the Actos as well because it was expensive and she could not afford it. She was not able to tolerate the Actos 30 mg as it made her feel bad. She was not able to afford the Jardiance. Feet have some tingling and burning, and she is taking neurontin and it helps but it makes her feel bad if she takes it during the day. She is wondering about something else but does not think she can afford Lyrica. She is getting shots for macular degeneration. Blood pressure has been under good control. Did  have a flu shot this year. Did have a pneumonia shot (pneumovax)2010 and prevnar 8/15. Did have a tetanus shot (tdap)4/15. Has had the Covid shot also. Ms. Emy Lewis  has  has a past medical history of Diabetes ([address] Hypertension, Menopause, Mixed diabetic hyperlipidemia associated with type 2 diabetes mellitus ([address] Tubulovillous adenoma of colon, Type 2 diabetes mellitus with diabetic polyneuropathy, without long-term current use of insulin ([address] and Type II or unspecified type diabetes mellitus without mention of complication, not stated as uncontrolled. Ms. Emy Lewis  has  has a past surgical history that includes Cholecystectomy; hernia repair (2017); hernia repair; Colonoscopy (N/A, 4/20/2017); Cataract removal (Bilateral, 2021); Dilation and curettage of uterus; and Colonoscopy (N/A, 6/22/2017).     Ms. Inessa   Current Outpatient Medications   Medication Sig Dispense Refill    olmesartan (BENICAR) 40 MG tablet Take 1 tablet by mouth daily 90 tablet 3    gabapentin (NEURONTIN) 300 MG capsule One po qhs 90 capsule 3    TRULICITY 3 MT/6.1QS SOPN Inject 3 mg into the skin once a week      acetaminophen (TYLENOL) 325 MG tablet Take by mouth every 4 hours as needed      FARXIGA 5 MG tablet Take 1 tablet by mouth every morning (Patient not taking: Reported on 9/28/2022) 30 tablet 11     No current facility-administered medications for this visit. Ms. Marciano Mahmood   Family History   Problem Relation Age of Onset    Stroke Sister     Other Brother         brain aneuyrsm     Cancer Sister         abdominal     Breast Cancer Sister     Diabetes Sister     Diabetes Sister     Breast Cancer Daughter 39    Lung Disease Brother     Diabetes Brother     Cancer Father         liver    Diabetes Mother         with amputation     Other Son 32        GSW    Heart Disease Sister         no MI- stent       Ms. Wylie    Social History     Socioeconomic History    Marital status:      Spouse name: Not on file    Number of children: Not on file    Years of education: Not on file    Highest education level: Not on file   Occupational History    Not on file   Tobacco Use    Smoking status: Never    Smokeless tobacco: Never   Substance and Sexual Activity    Alcohol use: No    Drug use: No    Sexual activity: Not on file   Other Topics Concern    Not on file   Social History Narrative    Not on file     Social Determinants of Health     Financial Resource Strain: Not on file   Food Insecurity: Not on file   Transportation Needs: Not on file   Physical Activity: Not on file   Stress: Not on file   Social Connections: Not on file   Intimate Partner Violence: Not on file   Housing Stability: Not on file       Ms. Marciano Mahmood  has the following allergies:    Allergies   Allergen Reactions    Lisinopril Other (See Comments) Sitagliptin Rash       /73   Pulse 75   Temp 97.3 °F (36.3 °C)   Resp 16   Ht 5' 2\" (1.575 m)   Wt 172 lb 6.4 oz (78.2 kg)   SpO2 98%   BMI 31.53 kg/m²     HEENT: Normocephalic, atraumatic, pupils equal and reactive to light. Neck: Supple, no masses or thyromegaly. Lungs: clear to auscultation bilaterally. CV: regular rate and rhythm, without murmurs, rubs, or gallops. Abdomen: soft, nontender, nondistended, no masses. No results found for this visit on 09/28/22. Juan Warner was seen today for diabetes. Diagnoses and all orders for this visit:    Essential hypertension, benign  -     olmesartan (BENICAR) 40 MG tablet; Take 1 tablet by mouth daily    Type 2 diabetes mellitus without complication, without long-term current use of insulin (HCC)    Exudative age-related macular degeneration of right eye with active choroidal neovascularization (HCC)    Type 2 diabetes mellitus with diabetic polyneuropathy, without long-term current use of insulin (HCC)  -     gabapentin (NEURONTIN) 300 MG capsule; One po qhs    Mixed diabetic hyperlipidemia associated with type 2 diabetes mellitus [address] [address]    Need for immunization against influenza  -     Influenza, FLUAD, (age 72 y+), IM, Preservative Free, 0.5 mL    Screening mammogram for high-risk patient  -     KAYLEE DIGITAL SCREEN W OR WO CAD BILATERAL; Future    Screening for malignant neoplasm of colon  -     [facility] - Ashia Collado MD, Gastroenterology    Continue follow-up with her endocrinologist.  Be sure to go for her colonoscopy. Be sure to go for mammogram.  Continue gabapentin. Continue her blood pressure medicine. Patient counseled on diet and exercise.     Demian Black MD

## 2024-01-04 PROCEDURE — 93296 REM INTERROG EVL PM/IDS: CPT | Performed by: INTERNAL MEDICINE

## 2024-01-04 PROCEDURE — 93294 REM INTERROG EVL PM/LDLS PM: CPT | Performed by: INTERNAL MEDICINE

## 2024-01-08 ENCOUNTER — TELEPHONE (OUTPATIENT)
Dept: PHARMACY | Age: 74
End: 2024-01-08

## 2024-01-08 NOTE — TELEPHONE ENCOUNTER
If his symptoms are improving after day 5 and he is fever-free for 24 hours (without the use of fever-reducing medication), may schedule at [facility] for 1/10/24.  Patient must wear a mask to appointment.     Thank you   Sharona Stinson [facility], PharmD, BCACP, 1/8/2024 2:38 PM

## 2024-01-08 NOTE — TELEPHONE ENCOUNTER
Called and spoke with patient.    If his symptoms are improving after day 5 and he is fever-free for 24 hours (without the use of fever-reducing medication), may schedule at [facility] for 1/10/24.  Patient must wear a mask to appointment.     Scheduled appt for Wednesday 1/10 @ 3:30 pm.    Electronically signed by Belen Diallo on 1/8/24 at 3:13 PM EST

## 2024-01-08 NOTE — TELEPHONE ENCOUNTER
Patient called to say that he tested positive for COVID on Saturday 1/6/24. He began having symptoms on either Wed. 1/3 or Thurs. 1/4. Patient is taking Zicam and Mucinex. Due to patient taking these meds, having COVID, and having a pacemaker, his doctor would like his INR checked asap.     His next appt @ [facility] is currently scheduled for Thursday 1/25 @ 1:15 pm.    Please advise/ Thank you.    Electronically signed by Belen Diallo on 1/8/24 at 1:43 PM EST

## 2024-01-10 ENCOUNTER — HOSPITAL ENCOUNTER (OUTPATIENT)
Dept: PHARMACY | Age: 74
Setting detail: THERAPIES SERIES
Discharge: HOME | End: 2024-01-10
Payer: MEDICARE

## 2024-01-10 VITALS
DIASTOLIC BLOOD PRESSURE: 80 MMHG | SYSTOLIC BLOOD PRESSURE: 160 MMHG | HEART RATE: 65 BPM | WEIGHT: 219.4 LBS | BODY MASS INDEX: 31.48 KG/M2

## 2024-01-10 DIAGNOSIS — I48.0 PAROXYSMAL ATRIAL FIBRILLATION (HCC): Primary | ICD-10-CM

## 2024-01-10 LAB — INTERNATIONAL NORMALIZATION RATIO, POC: 2.5

## 2024-01-10 PROCEDURE — 99211 OFF/OP EST MAY X REQ PHY/QHP: CPT

## 2024-01-10 PROCEDURE — 85610 PROTHROMBIN TIME: CPT

## 2024-01-10 NOTE — PROGRESS NOTES
[facility] (Medication Management)  [address] [place], [place], [address]  Phone: [phone]    Face-To-Face Visit  Patient Findings       Positives:  Change in medications (TAKING MUCINEX FOR COVID SYMPTOMS, THIS IS DAY 6, HE IS FEELING MUCH BETTER, NO FEVER)    Negatives:  Signs/symptoms of thrombosis, Signs/symptoms of bleeding, Laboratory test error suspected, Change in health, Change in alcohol use, Change in activity, Upcoming invasive procedure, Emergency department visit, Upcoming dental procedure, Missed doses, Extra doses, Change in diet/appetite, Hospital admission, Bruising, Other complaints    Comments:  PCP IN MARCH           Assessment/Plan:  Warfarin indication: atrial fibrillation      INR today is therapeutic at 2.5, goal is 2-3.    Warfarin Dose: same (7.5 mg every Mon./Fri.; 5 mg all other days)    Follow Up: 4 weeks      Sharona Stinson RPH PharmD, BCACP 1/10/2024 3:41 PM    For Pharmacy Admin Tracking Only    Intervention Detail:   Total # of Interventions Recommended: 0  Total # of Interventions Accepted: 0  Time Spent (min): 15

## 2024-02-07 ENCOUNTER — HOSPITAL ENCOUNTER (OUTPATIENT)
Dept: PHARMACY | Age: 74
Setting detail: THERAPIES SERIES
Discharge: HOME | End: 2024-02-07
Payer: MEDICARE

## 2024-02-07 VITALS
WEIGHT: 220 LBS | SYSTOLIC BLOOD PRESSURE: 159 MMHG | HEART RATE: 65 BPM | BODY MASS INDEX: 31.57 KG/M2 | DIASTOLIC BLOOD PRESSURE: 78 MMHG

## 2024-02-07 DIAGNOSIS — I48.0 PAROXYSMAL ATRIAL FIBRILLATION (HCC): Primary | ICD-10-CM

## 2024-02-07 LAB — INTERNATIONAL NORMALIZATION RATIO, POC: 3.4

## 2024-02-07 PROCEDURE — 99212 OFFICE O/P EST SF 10 MIN: CPT

## 2024-02-07 PROCEDURE — 85610 PROTHROMBIN TIME: CPT

## 2024-02-07 NOTE — PROGRESS NOTES
[facility] Anticoagulation Clinic (Medication Management)  [address] [place], [place], [address]  Phone: [phone]    Face-To-Face Visit  Patient Findings       Negatives:  Signs/symptoms of thrombosis, Signs/symptoms of bleeding, Laboratory test error suspected, Change in health, Change in alcohol use, Change in activity, Upcoming invasive procedure, Emergency department visit, Upcoming dental procedure, Missed doses, Extra doses, Change in medications, Change in diet/appetite, Hospital admission, Bruising, Other complaints    Comments:  PCP IN MARCH           Assessment/Plan:  Warfarin indication: atrial fibrillation      INR today is SUPRAtherapeutic at 3.4, goal is 2-3. NO SPECIFIC CAUSE FOR HIGH INR, LAST 3 OUT OF 4 INR READINGS WERE ABOVE GOAL.     Warfarin Dose: DECREASE WEEKLY DOSE 6% AND TAKE 7.5 MG EVERY MON.; 5 MG ALL OTHER DAYS    Follow Up: 2 weeks      Sharona Stinson RPH PharmD, BCACP 2/7/2024 3:34 PM    For Pharmacy Admin Tracking Only    Intervention Detail: Dose Adjustment: 1, reason: Therapy De-escalation  Total # of Interventions Recommended: 1  Total # of Interventions Accepted: 1  Time Spent (min): 15

## 2024-02-22 ENCOUNTER — HOSPITAL ENCOUNTER (OUTPATIENT)
Dept: GENERAL RADIOLOGY | Age: 74
Discharge: HOME | End: 2024-02-24
Payer: MEDICARE

## 2024-02-22 ENCOUNTER — HOSPITAL ENCOUNTER (OUTPATIENT)
Age: 74
Discharge: HOME | End: 2024-02-24
Payer: MEDICARE

## 2024-02-22 DIAGNOSIS — R05.1 ACUTE COUGH: ICD-10-CM

## 2024-02-22 PROCEDURE — 71046 X-RAY EXAM CHEST 2 VIEWS: CPT

## 2024-02-29 ENCOUNTER — HOSPITAL ENCOUNTER (OUTPATIENT)
Dept: PHARMACY | Age: 74
Setting detail: THERAPIES SERIES
Discharge: HOME | End: 2024-02-29
Payer: MEDICARE

## 2024-02-29 VITALS
SYSTOLIC BLOOD PRESSURE: 180 MMHG | HEART RATE: 67 BPM | DIASTOLIC BLOOD PRESSURE: 92 MMHG | BODY MASS INDEX: 32.28 KG/M2 | WEIGHT: 225 LBS

## 2024-02-29 DIAGNOSIS — I48.0 PAROXYSMAL ATRIAL FIBRILLATION (HCC): Primary | ICD-10-CM

## 2024-02-29 LAB — INTERNATIONAL NORMALIZATION RATIO, POC: 2.7

## 2024-02-29 PROCEDURE — 99211 OFF/OP EST MAY X REQ PHY/QHP: CPT

## 2024-02-29 PROCEDURE — 85610 PROTHROMBIN TIME: CPT

## 2024-02-29 NOTE — PROGRESS NOTES
[facility] Anticoagulation Clinic (Medication Management)  [address] [place], [place], [address]  Phone: [phone]    Face-To-Face Visit  Patient Findings       Negatives:  Signs/symptoms of thrombosis, Signs/symptoms of bleeding, Laboratory test error suspected, Change in health, Change in alcohol use, Change in activity, Upcoming invasive procedure, Emergency department visit, Upcoming dental procedure, Missed doses, Extra doses, Change in medications, Change in diet/appetite, Hospital admission, Bruising, Other complaints           Assessment/Plan:  Warfarin indication: atrial fibrillation      INR today is therapeutic at 2.7, goal is 2-3.    Warfarin Dose: same (7.5 mg every Mon.; 5 mg all other days)    Follow Up: 4 weeks      Sharona Stinson [facility] PharmD, BCACP 2/29/2024 11:09 AM    For Pharmacy Admin Tracking Only    Intervention Detail:   Total # of Interventions Recommended: 0  Total # of Interventions Accepted: 0  Time Spent (min): 15

## 2024-03-14 ENCOUNTER — TELEPHONE (OUTPATIENT)
Dept: NON INVASIVE DIAGNOSTICS | Age: 74
End: 2024-03-14

## 2024-03-14 NOTE — TELEPHONE ENCOUNTER
Patient came into the office stating he had a DOT physical and he needs something stating he has a pacemaker and is ok to drive.  Stated he has elevated BP and needs all meds we prescribe and why he takes them.       As per Carline, appointment made for 3/22/24

## 2024-03-22 ENCOUNTER — OFFICE VISIT (OUTPATIENT)
Dept: NON INVASIVE DIAGNOSTICS | Age: 74
End: 2024-03-22
Payer: MEDICARE

## 2024-03-22 VITALS
DIASTOLIC BLOOD PRESSURE: 82 MMHG | RESPIRATION RATE: 16 BRPM | BODY MASS INDEX: 31.84 KG/M2 | HEIGHT: 69 IN | SYSTOLIC BLOOD PRESSURE: 130 MMHG | WEIGHT: 215 LBS | OXYGEN SATURATION: 97 % | HEART RATE: 62 BPM

## 2024-03-22 DIAGNOSIS — I48.0 PAF (PAROXYSMAL ATRIAL FIBRILLATION) (HCC): Primary | ICD-10-CM

## 2024-03-22 DIAGNOSIS — Z95.0 PACEMAKER: ICD-10-CM

## 2024-03-22 DIAGNOSIS — I45.9 HB (HEART BLOCK): ICD-10-CM

## 2024-03-22 PROCEDURE — G8484 FLU IMMUNIZE NO ADMIN: HCPCS | Performed by: NURSE PRACTITIONER

## 2024-03-22 PROCEDURE — 1123F ACP DISCUSS/DSCN MKR DOCD: CPT | Performed by: NURSE PRACTITIONER

## 2024-03-22 PROCEDURE — 3017F COLORECTAL CA SCREEN DOC REV: CPT | Performed by: NURSE PRACTITIONER

## 2024-03-22 PROCEDURE — 3075F SYST BP GE 130 - 139MM HG: CPT | Performed by: NURSE PRACTITIONER

## 2024-03-22 PROCEDURE — G8427 DOCREV CUR MEDS BY ELIG CLIN: HCPCS | Performed by: NURSE PRACTITIONER

## 2024-03-22 PROCEDURE — 99213 OFFICE O/P EST LOW 20 MIN: CPT | Performed by: NURSE PRACTITIONER

## 2024-03-22 PROCEDURE — 1036F TOBACCO NON-USER: CPT | Performed by: NURSE PRACTITIONER

## 2024-03-22 PROCEDURE — 3079F DIAST BP 80-89 MM HG: CPT | Performed by: NURSE PRACTITIONER

## 2024-03-22 PROCEDURE — 93000 ELECTROCARDIOGRAM COMPLETE: CPT | Performed by: INTERNAL MEDICINE

## 2024-03-22 PROCEDURE — G8417 CALC BMI ABV UP PARAM F/U: HCPCS | Performed by: NURSE PRACTITIONER

## 2024-03-22 RX ORDER — CLINDAMYCIN HYDROCHLORIDE 300 MG/1
300 CAPSULE ORAL 3 TIMES DAILY
COMMUNITY
Start: 2024-03-20

## 2024-03-22 RX ORDER — AMLODIPINE BESYLATE 5 MG/1
5 TABLET ORAL DAILY
COMMUNITY
Start: 2024-03-18

## 2024-03-22 RX ORDER — CHLORAL HYDRATE 500 MG
3000 CAPSULE ORAL DAILY
COMMUNITY
Start: 2021-11-18

## 2024-03-22 NOTE — PROGRESS NOTES
verify function of the device.        Assessment and plan:    1. Dual chamber pacemaker in situ   - see # 1      2. Intermittent complete AV block  - In the setting of PAT/PAFon medical therapy and chronic bifascicular block.     3.  Paroxysmal atrial tachycardia and reported history of paroxysmal atrial fibrillation  - Reported history of paroxysmal atrial fibrillation on 3/23/20 by ED physician.  - No EKG or strip during PAF available for review except EKG that showed runs of paroxysmal atrial tachycardia on 6/11/20.  - [id number] = 2 (HTN and age).  - On Eliquis for stroke risk reduction..  - On Toprol XL for rate control.  - Thus far has had no cardioversion or ablation.  - Presents on 10/28/20 with Rosario  - Multaq was discontinued 10/28/20 and started on Flecainide 11/1/20-continues on flecainide without any arrhythmias noted on pacemaker check today     4. RBBB and LAFB  - Chronic.     5. Hypertension  - On Toprol XL.     6. History of lymphoma  - In remission.     7. GERD  - On Protonix.     8. Dental caries- on clindamyacin        Recommendations:     1.  Continue flecainide 100 mg twice daily  2.  Continue warfarin for INR goal 2-3  3.   Stress test next year while on flecainide, last treadmill stress was August 2020.  Patient denies any chest pain with activity  4.  Remote transmissions every 91 days, office visit in 6 months or sooner if needed      Re-education on importance of well controlled HTN (goal BP < 130/80), adequate weight control (goal BMI of < 27), physical activity consisting of moderate cardiopulmonary exercise up to a goal of 250 min/wk, smoking/tobacco abstinence and limited ETOH intake.       I have spent a total of 29 minutes with the patient and the family reviewing the above stated recommendations. And a total of >50% of that time involved face-to-face time providing counseling and or coordination of care with the other providers.      Thank you for allowing me to

## 2024-03-28 ENCOUNTER — HOSPITAL ENCOUNTER (OUTPATIENT)
Dept: PHARMACY | Age: 74
Setting detail: THERAPIES SERIES
Discharge: HOME | End: 2024-03-28
Payer: MEDICARE

## 2024-03-28 VITALS
HEART RATE: 72 BPM | DIASTOLIC BLOOD PRESSURE: 77 MMHG | WEIGHT: 221.2 LBS | BODY MASS INDEX: 32.67 KG/M2 | SYSTOLIC BLOOD PRESSURE: 157 MMHG

## 2024-03-28 DIAGNOSIS — I48.0 PAROXYSMAL ATRIAL FIBRILLATION (HCC): Primary | ICD-10-CM

## 2024-03-28 LAB — INTERNATIONAL NORMALIZATION RATIO, POC: 2.3

## 2024-03-28 PROCEDURE — 85610 PROTHROMBIN TIME: CPT

## 2024-03-28 PROCEDURE — 99211 OFF/OP EST MAY X REQ PHY/QHP: CPT

## 2024-03-28 NOTE — PROGRESS NOTES
[facility]ticoagulation Clinic (Medication Management)  [address] [place], [place], [address]  Phone: [phone]    Face-To-Face Visit  Patient Findings       Positives:  Upcoming dental procedure (4/25: consult w/ oral surgeon to have tooth extracted)    Negatives:  Signs/symptoms of thrombosis, Signs/symptoms of bleeding, Laboratory test error suspected, Change in health, Change in alcohol use, Change in activity, Upcoming invasive procedure, Emergency department visit, Missed doses, Extra doses, Change in medications, Change in diet/appetite, Hospital admission, Bruising, Other complaints           Assessment/Plan:  Warfarin indication: atrial fibrillation      INR today is therapeutic at 2.3, goal is 2-3.    Warfarin Dose: same (7.5 mg every Mon.; 5 mg all other days)    Follow Up: 4 weeks      Sharona Stinson [facility] PharmD, BCACP 3/28/2024 1:22 PM    For Pharmacy Admin Tracking Only    Intervention Detail:   Total # of Interventions Recommended: 0  Total # of Interventions Accepted: 0  Time Spent (min): 15

## 2024-04-04 PROCEDURE — 93294 REM INTERROG EVL PM/LDLS PM: CPT | Performed by: INTERNAL MEDICINE

## 2024-04-04 PROCEDURE — 93296 REM INTERROG EVL PM/IDS: CPT | Performed by: INTERNAL MEDICINE

## 2024-04-05 RX ORDER — METOPROLOL SUCCINATE 50 MG/1
50 TABLET, EXTENDED RELEASE ORAL DAILY
Qty: 90 TABLET | Refills: 3 | Status: SHIPPED | OUTPATIENT
Start: 2024-04-05

## 2024-04-26 ENCOUNTER — HOSPITAL ENCOUNTER (OUTPATIENT)
Dept: PHARMACY | Age: 74
Setting detail: THERAPIES SERIES
Discharge: HOME | End: 2024-04-26
Payer: MEDICARE

## 2024-04-26 VITALS
HEART RATE: 67 BPM | DIASTOLIC BLOOD PRESSURE: 72 MMHG | WEIGHT: 224.4 LBS | SYSTOLIC BLOOD PRESSURE: 137 MMHG | BODY MASS INDEX: 33.14 KG/M2

## 2024-04-26 DIAGNOSIS — I48.0 PAROXYSMAL ATRIAL FIBRILLATION (HCC): Primary | ICD-10-CM

## 2024-04-26 LAB — INTERNATIONAL NORMALIZATION RATIO, POC: 2.8

## 2024-04-26 PROCEDURE — 99211 OFF/OP EST MAY X REQ PHY/QHP: CPT

## 2024-04-26 PROCEDURE — 85610 PROTHROMBIN TIME: CPT

## 2024-04-26 NOTE — PROGRESS NOTES
[facility] Anticoagulation Clinic (Medication Management)  [address] [place], [place], [address]  Phone: [phone]    Face-To-Face Visit  Patient Findings       Positives:  Upcoming dental procedure (He had 2 teeth pulled this week, did not hold prior to), Change in medications (He is on clindamycin for recent dental extractions)    Negatives:  Signs/symptoms of thrombosis, Signs/symptoms of bleeding, Laboratory test error suspected, Change in health, Change in alcohol use, Change in activity, Upcoming invasive procedure, Emergency department visit, Missed doses, Extra doses, Change in diet/appetite, Hospital admission, Bruising, Other complaints           Assessment/Plan:  Warfarin indication: atrial fibrillation      INR today is therapeutic at 2.8, goal is 2-3.    Warfarin Dose: same (7.5 mg every Mon.; 5 mg all other days)    Follow Up: 4 weeks      Sharona Stinson GERALD PharmD, BCACP 4/26/2024 2:38 PM    For Pharmacy Admin Tracking Only    Intervention Detail:   Total # of Interventions Recommended: 0  Total # of Interventions Accepted: 0  Time Spent (min): 15

## 2024-05-23 ENCOUNTER — HOSPITAL ENCOUNTER (OUTPATIENT)
Dept: PHARMACY | Age: 74
Setting detail: THERAPIES SERIES
Discharge: HOME | End: 2024-05-23
Payer: MEDICARE

## 2024-05-23 VITALS
BODY MASS INDEX: 32.05 KG/M2 | WEIGHT: 217 LBS | SYSTOLIC BLOOD PRESSURE: 123 MMHG | DIASTOLIC BLOOD PRESSURE: 68 MMHG | HEART RATE: 65 BPM

## 2024-05-23 DIAGNOSIS — I48.0 PAROXYSMAL ATRIAL FIBRILLATION (HCC): Primary | ICD-10-CM

## 2024-05-23 LAB — INTERNATIONAL NORMALIZATION RATIO, POC: 2

## 2024-05-23 PROCEDURE — 99211 OFF/OP EST MAY X REQ PHY/QHP: CPT

## 2024-05-23 PROCEDURE — 85610 PROTHROMBIN TIME: CPT

## 2024-05-23 NOTE — PROGRESS NOTES
[facility] Anticoagulation Clinic (Medication Management)  [address] [place], [place], [address]  Phone: [phone]    Face-To-Face Visit  Patient Findings       Positives:  Change in diet/appetite (Increased vitamin K in his diet (beans, brussel sprouts, junaid greens))    Negatives:  Signs/symptoms of thrombosis, Signs/symptoms of bleeding, Laboratory test error suspected, Change in health, Change in alcohol use, Change in activity, Upcoming invasive procedure, Emergency department visit, Upcoming dental procedure, Missed doses, Extra doses, Change in medications, Hospital admission, Bruising, Other complaints    Comments:  PCP July 9th           Assessment/Plan:  Warfarin indication: atrial fibrillation      INR today is therapeutic at 2, goal is 2-3.    Warfarin Dose: same (7.5 mg every Mon.; 5 mg all other days)    Follow Up: 4 weeks      Sharona Stinson RPH PharmD, BCACP 5/23/2024 11:27 AM    For Pharmacy Admin Tracking Only    Intervention Detail:   Total # of Interventions Recommended: 0  Total # of Interventions Accepted: 0  Time Spent (min): 15

## 2024-05-31 ENCOUNTER — TELEPHONE (OUTPATIENT)
Dept: NON INVASIVE DIAGNOSTICS | Age: 74
End: 2024-05-31

## 2024-05-31 DIAGNOSIS — I48.0 PAF (PAROXYSMAL ATRIAL FIBRILLATION) (HCC): Primary | ICD-10-CM

## 2024-05-31 NOTE — TELEPHONE ENCOUNTER
----- Message from Belen Diallo sent at 2024  2:00 PM EDT -----  Regarding: Referral order for anticoagulation monitoring  Dr. Aretha Mims's current referral order for anticoagulation monitoring will  on 2024. Please send a new referral order through [facility] using: [id number] - AMB REFERRAL TO ANTICOAGULATION MONITORING.    If you have any questions, please contact our office.    Thank you!     Belen Diallo CPhT   [facility]   Ph.: [phone]

## 2024-06-20 ENCOUNTER — HOSPITAL ENCOUNTER (OUTPATIENT)
Dept: PHARMACY | Age: 74
Setting detail: THERAPIES SERIES
Discharge: HOME | End: 2024-06-20
Payer: MEDICARE

## 2024-06-20 VITALS
BODY MASS INDEX: 32.25 KG/M2 | DIASTOLIC BLOOD PRESSURE: 71 MMHG | WEIGHT: 218.4 LBS | HEART RATE: 67 BPM | SYSTOLIC BLOOD PRESSURE: 148 MMHG

## 2024-06-20 DIAGNOSIS — I48.0 PAROXYSMAL ATRIAL FIBRILLATION (HCC): Primary | ICD-10-CM

## 2024-06-20 LAB — INTERNATIONAL NORMALIZATION RATIO, POC: 2.8

## 2024-06-20 PROCEDURE — 85610 PROTHROMBIN TIME: CPT

## 2024-06-20 PROCEDURE — 99211 OFF/OP EST MAY X REQ PHY/QHP: CPT

## 2024-06-20 NOTE — PROGRESS NOTES
[facility] Anticoagulation Clinic (Medication Management)  [address] [place], [address]  Phone: [phone]    Face-To-Face Visit  Patient Findings       Negatives:  Signs/symptoms of thrombosis, Signs/symptoms of bleeding, Laboratory test error suspected, Change in health, Change in alcohol use, Change in activity, Upcoming invasive procedure, Emergency department visit, Upcoming dental procedure, Missed doses, Extra doses, Change in medications, Change in diet/appetite, Hospital admission, Bruising, Other complaints    Comments:  PCP July 9           Assessment/Plan:  Warfarin indication: atrial fibrillation      INR today is therapeutic at 2.8, goal is 2-3.    Warfarin Dose: same (7.5 mg every Mon.; 5 mg all other days)    Follow Up: 5 weeks      Sharona Stinson RPH PharmD, BCACP 6/20/2024 11:21 AM    For Pharmacy Admin Tracking Only    Intervention Detail:   Total # of Interventions Recommended: 0  Total # of Interventions Accepted: 0  Time Spent (min): 15

## 2024-07-04 PROCEDURE — 93294 REM INTERROG EVL PM/LDLS PM: CPT | Performed by: INTERNAL MEDICINE

## 2024-07-04 PROCEDURE — 93296 REM INTERROG EVL PM/IDS: CPT | Performed by: INTERNAL MEDICINE

## 2024-07-25 ENCOUNTER — HOSPITAL ENCOUNTER (OUTPATIENT)
Dept: PHARMACY | Age: 74
Setting detail: THERAPIES SERIES
Discharge: HOME | End: 2024-07-25
Payer: MEDICARE

## 2024-07-25 VITALS
BODY MASS INDEX: 31.78 KG/M2 | HEART RATE: 75 BPM | WEIGHT: 215.2 LBS | SYSTOLIC BLOOD PRESSURE: 142 MMHG | DIASTOLIC BLOOD PRESSURE: 83 MMHG

## 2024-07-25 DIAGNOSIS — I48.0 PAROXYSMAL ATRIAL FIBRILLATION (HCC): Primary | ICD-10-CM

## 2024-07-25 LAB
INTERNATIONAL NORMALIZATION RATIO, POC: 2.3
PROTHROMBIN TIME, POC: NORMAL

## 2024-07-25 PROCEDURE — 99211 OFF/OP EST MAY X REQ PHY/QHP: CPT

## 2024-07-25 PROCEDURE — 85610 PROTHROMBIN TIME: CPT

## 2024-07-25 NOTE — PROGRESS NOTES
[facility] Anticoagulation Clinic (Medication Management)  [address] [place], [address]  Phone: [phone]    Face-To-Face Visit  Patient Findings       Negatives:  Signs/symptoms of thrombosis, Signs/symptoms of bleeding, Laboratory test error suspected, Change in health, Change in alcohol use, Change in activity, Upcoming invasive procedure, Emergency department visit, Upcoming dental procedure, Missed doses, Extra doses, Change in medications, Change in diet/appetite, Hospital admission, Bruising, Other complaints           Assessment/Plan:  Warfarin indication: atrial fibrillation      INR today is therapeutic at 2.3, goal is 2-3.    Warfarin Dose: same (7.5 mg every Mon.; 5 mg all other days)    Follow Up: 6 weeks      Sharona Stinson [facility] PharmD, BCACP 7/25/2024 11:27 AM    For Pharmacy Admin Tracking Only    Intervention Detail:   Total # of Interventions Recommended: 0  Total # of Interventions Accepted: 0  Time Spent (min): 15

## 2024-09-05 ENCOUNTER — ANTI-COAG VISIT (OUTPATIENT)
Age: 74
End: 2024-09-05
Payer: MEDICARE

## 2024-09-05 VITALS
WEIGHT: 220.2 LBS | DIASTOLIC BLOOD PRESSURE: 77 MMHG | BODY MASS INDEX: 32.52 KG/M2 | HEART RATE: 67 BPM | SYSTOLIC BLOOD PRESSURE: 164 MMHG

## 2024-09-05 DIAGNOSIS — I48.0 PAROXYSMAL ATRIAL FIBRILLATION (HCC): Primary | ICD-10-CM

## 2024-09-05 LAB
INTERNATIONAL NORMALIZATION RATIO, POC: 2.4
PROTHROMBIN TIME, POC: 0

## 2024-09-05 PROCEDURE — 99211 OFF/OP EST MAY X REQ PHY/QHP: CPT

## 2024-09-05 PROCEDURE — 85610 PROTHROMBIN TIME: CPT

## 2024-09-05 NOTE — PROGRESS NOTES
[facility] Anticoagulation Clinic (Medication Management)  [address] [place], [place], [address]  Phone: [phone]    Face-To-Face Visit  Patient Findings       Positives:  Upcoming dental procedure (He will have 1 tooth extracted on 9/19 w/ oral surgeon (not given warfarin instructions) - he will also have dental implants placed. He is going surgeon today and will inquire about holding warfarin. He will call me back.)    Negatives:  Signs/symptoms of thrombosis, Signs/symptoms of bleeding, Laboratory test error suspected, Change in health, Change in alcohol use, Change in activity, Upcoming invasive procedure, Emergency department visit, Missed doses, Extra doses, Change in medications, Change in diet/appetite, Hospital admission, Bruising, Other complaints    Comments:  Urology appt in Oct.           Assessment/Plan:  Warfarin indication: atrial fibrillation      INR today is therapeutic at 2.4, goal is 2-3.    Warfarin Dose: same (7.5 mg every Mon; 5 mg all other days)    Follow Up: 6 weeks      Sharona Stinson RPH PharmD, BCACP 9/5/2024 11:36 AM    Patient called today to report he does not need to hold warfarin prior to dental extractions and implants.   Sharona Stinson RPH, PharmD, BCACP, 9/5/2024 1:17 PM    For Pharmacy Admin Tracking Only    Intervention Detail:   Total # of Interventions Recommended: 0  Total # of Interventions Accepted: 0  Time Spent (min): 15

## 2024-09-19 ENCOUNTER — TELEPHONE (OUTPATIENT)
Dept: NON INVASIVE DIAGNOSTICS | Age: 74
End: 2024-09-19

## 2024-10-03 PROCEDURE — 93294 REM INTERROG EVL PM/LDLS PM: CPT | Performed by: INTERNAL MEDICINE

## 2024-10-03 PROCEDURE — 93296 REM INTERROG EVL PM/IDS: CPT | Performed by: INTERNAL MEDICINE

## 2024-10-07 NOTE — PROGRESS NOTES
Transthoracic Echocardiography Report (TTE)      Demographics      Patient Name       ROBERTO CARLOS TEMPLETON  Gender              Male                      M      Medical Record     [id number]      Room Number   Number      Account #          [id number]     Procedure Date      08/06/2020      Corporate ID                     Ordering Physician  George Carias MD      Accession Number   [id number]    Referring Physician CITLALY JORDAN DO      Date of Birth      1950    Sonographer         Bronwyn Darion [facility]      Age                69 year(s)    Interpreting        Larry Perez MD                                    Physician                                       Any Other     Procedure     Type of Study      TTE procedure:Echo Complete W/ Dop & Color Flow.     Procedure Date  Date: 08/06/2020 Start: 08:22 AM     Study Location: Echo Lab  Technical Quality: Adequate visualization     Indications:LV function.     Patient Status: Routine     Height: 68 inches Weight: 212 pounds BSA: 2.1 m^2 BMI: 32.23 kg/m^2     BP: 136/62 mmHg      Findings      Left Ventricle   Left ventricular size is grossly normal.   Ejection fraction is visually estimated at 60%.   No regional wall motion abnormalities seen.   Indeterminate diastolic function.      Right Ventricle   Normal right ventricular size and function.      Left Atrium   Normal sized left atrium.      Right Atrium   Normal right atrium size.      Mitral Valve   Physiologic and/or trace mitral regurgitation is present.      Tricuspid Valve   Mild tricuspid regurgitation. RVSP is 16 mmHg.      Aortic Valve   Aortic valve opens well.      Pulmonic Valve   Physiologic and/or trace pulmonic regurgitation present.      Pericardial Effusion   No evidence of pericardial effusion.      Aorta   Aortic root dimension within normal limits.   Miscellaneous   Normal Inferior Vena Cava diameter and respiratory variation.

## 2024-10-08 ENCOUNTER — OFFICE VISIT (OUTPATIENT)
Dept: NON INVASIVE DIAGNOSTICS | Age: 74
End: 2024-10-08

## 2024-10-08 VITALS
HEART RATE: 65 BPM | BODY MASS INDEX: 33.31 KG/M2 | HEIGHT: 68 IN | WEIGHT: 219.8 LBS | SYSTOLIC BLOOD PRESSURE: 140 MMHG | DIASTOLIC BLOOD PRESSURE: 78 MMHG | RESPIRATION RATE: 16 BRPM

## 2024-10-08 DIAGNOSIS — I48.0 PAF (PAROXYSMAL ATRIAL FIBRILLATION) (HCC): Primary | ICD-10-CM

## 2024-10-08 DIAGNOSIS — Z95.0 CARDIAC PACEMAKER IN SITU: ICD-10-CM

## 2024-10-24 ENCOUNTER — ANTI-COAG VISIT (OUTPATIENT)
Age: 74
End: 2024-10-24
Payer: MEDICARE

## 2024-10-24 VITALS — WEIGHT: 220 LBS | BODY MASS INDEX: 33.45 KG/M2

## 2024-10-24 DIAGNOSIS — I48.0 PAROXYSMAL ATRIAL FIBRILLATION (HCC): Primary | ICD-10-CM

## 2024-10-24 LAB
INTERNATIONAL NORMALIZATION RATIO, POC: 2.6
PROTHROMBIN TIME, POC: 0

## 2024-10-24 PROCEDURE — 85610 PROTHROMBIN TIME: CPT

## 2024-10-24 PROCEDURE — 99211 OFF/OP EST MAY X REQ PHY/QHP: CPT

## 2024-10-24 NOTE — PROGRESS NOTES
[facility] Anticoagulation Clinic (Medication Management)  [address] [place], [address]  Phone: [phone]    Face-To-Face Visit  Patient Findings       Positives:  Change in medications (Increased glipizide to 10 mg in AM and 5 mg in PM)    Negatives:  Signs/symptoms of thrombosis, Signs/symptoms of bleeding, Laboratory test error suspected, Change in health, Change in alcohol use, Change in activity, Upcoming invasive procedure, Emergency department visit, Upcoming dental procedure, Missed doses, Extra doses, Change in diet/appetite, Hospital admission, Bruising, Other complaints           Assessment/Plan:  Warfarin indication: atrial fibrillation      INR today is therapeutic at 2.6, goal is 2-3.    Warfarin Dose: same (7.5 mg every Monday; 5 mg all other days)    Follow Up: 6 weeks      Sharona Stinson [facility] PharmD, BCACP 10/24/2024 2:56 PM    For Pharmacy Admin Tracking Only    Intervention Detail:   Total # of Interventions Recommended: 0  Total # of Interventions Accepted: 0  Time Spent (min): 15

## 2024-11-06 ENCOUNTER — TELEPHONE (OUTPATIENT)
Age: 74
End: 2024-11-06

## 2024-11-06 DIAGNOSIS — I48.0 PAROXYSMAL ATRIAL FIBRILLATION (HCC): Primary | ICD-10-CM

## 2024-11-06 NOTE — TELEPHONE ENCOUNTER
Patient called and left message on VM stating he forgot to take \"the half pill on Monday (11/4/24), took the whole one, but forgot to take the half\".    Patient's next appt @ [facility] is scheduled for Thursday 12/12 @ 1 pm.    Please advise of any warfarin dosing or scheduling changes. Thank you.    Electronically signed by Belen Diallo on 11/6/24 at 8:34 AM EST

## 2024-11-06 NOTE — TELEPHONE ENCOUNTER
Please tell him to take 1.5 tablets today (11/6/24), then resume warfarin dose as usual (7.5 mg every Monday; 5 mg all other days). He has 5 mg warfarin tablets.   Thanks,  Sharona Stinson [facility], PharmD, BCACP, 11/6/2024 9:46 AM

## 2024-11-06 NOTE — TELEPHONE ENCOUNTER
Called and spoke with patient. He already took his warfarin dose today (whole tablet). He stated he takes his medication at 8 am. He is already at work. Should he take the half tablet later today?    Electronically signed by Belen Diallo on 11/6/24 at 10:29 AM EST

## 2024-11-06 NOTE — TELEPHONE ENCOUNTER
Called and spoke with patient. Stated, per Sharona, OK to take the HALF tablet later today (11/6) to make the entire 1.5 tablets needed. Then, resume warfarin dose as usual (7.5 mg every Monday; 5 mg all other days). He has 5 mg warfarin tablets.     Electronically signed by Belen Diallo on 11/6/24 at 11:05 AM EST

## 2024-12-12 ENCOUNTER — ANTI-COAG VISIT (OUTPATIENT)
Age: 74
End: 2024-12-12
Payer: MEDICARE

## 2024-12-12 VITALS
BODY MASS INDEX: 34.36 KG/M2 | WEIGHT: 226 LBS | SYSTOLIC BLOOD PRESSURE: 151 MMHG | HEART RATE: 75 BPM | DIASTOLIC BLOOD PRESSURE: 79 MMHG

## 2024-12-12 DIAGNOSIS — I48.0 PAROXYSMAL ATRIAL FIBRILLATION (HCC): Primary | ICD-10-CM

## 2024-12-12 LAB
INTERNATIONAL NORMALIZATION RATIO, POC: 2.4
PROTHROMBIN TIME, POC: 0

## 2024-12-12 PROCEDURE — 99211 OFF/OP EST MAY X REQ PHY/QHP: CPT

## 2024-12-12 PROCEDURE — 85610 PROTHROMBIN TIME: CPT

## 2024-12-12 NOTE — PROGRESS NOTES
[facility] Anticoagulation Clinic (Medication Management)  [address] [place], [address]  Phone: [phone]    Face-To-Face Visit  Patient Findings       Negatives:  Signs/symptoms of thrombosis, Signs/symptoms of bleeding, Laboratory test error suspected, Change in health, Change in alcohol use, Change in activity, Upcoming invasive procedure, Emergency department visit, Upcoming dental procedure, Missed doses, Extra doses, Change in medications, Change in diet/appetite, Hospital admission, Bruising, Other complaints    Comments:  Podiatrist in Jan.           Assessment/Plan:  Warfarin indication: atrial fibrillation      INR today is therapeutic at 2.4, goal is 2-3.    Warfarin Dose: same (7.5 mg every Monday; 5 mg all other days)    Follow Up:  Recheck INR in 6 week(s).      Sharona Stinson [facility] PharmD, BCACP 12/12/2024 1:04 PM    For Pharmacy Admin Tracking Only    Intervention Detail:   Total # of Interventions Recommended: 0  Total # of Interventions Accepted: 0  Time Spent (min): 15

## 2025-01-23 ENCOUNTER — ANTI-COAG VISIT (OUTPATIENT)
Age: 75
End: 2025-01-23
Payer: MEDICARE

## 2025-01-23 VITALS
WEIGHT: 220.6 LBS | SYSTOLIC BLOOD PRESSURE: 154 MMHG | HEART RATE: 65 BPM | BODY MASS INDEX: 33.54 KG/M2 | DIASTOLIC BLOOD PRESSURE: 77 MMHG

## 2025-01-23 DIAGNOSIS — I48.0 PAROXYSMAL ATRIAL FIBRILLATION (HCC): Primary | ICD-10-CM

## 2025-01-23 LAB
INTERNATIONAL NORMALIZATION RATIO, POC: 2.9
PROTHROMBIN TIME, POC: 0

## 2025-01-23 PROCEDURE — 85610 PROTHROMBIN TIME: CPT

## 2025-01-23 PROCEDURE — 99211 OFF/OP EST MAY X REQ PHY/QHP: CPT

## 2025-01-23 NOTE — PROGRESS NOTES
[facility] Anticoagulation Clinic (Medication Management)  [address] [place], [address]  Phone: [phone]    Face-To-Face Visit  Patient Findings       Positives:  Change in diet/appetite (Increased vitamin K (green beans and brussel sprouts))    Negatives:  Signs/symptoms of thrombosis, Signs/symptoms of bleeding, Laboratory test error suspected, Change in health, Change in alcohol use, Change in activity, Upcoming invasive procedure, Emergency department visit, Upcoming dental procedure, Missed doses, Extra doses, Change in medications, Hospital admission, Bruising, Other complaints           Assessment/Plan:  Warfarin indication: atrial fibrillation      INR today is therapeutic at 2.9, goal is 2-3.    Warfarin Dose: same (7.5 mg every Mon; 5 mg all other days)     Follow Up:  Recheck INR in 6 week(s).      Sharona Stinson [facility] PharmD, BCACP 1/23/2025 1:09 PM    For Pharmacy Admin Tracking Only    Intervention Detail:   Total # of Interventions Recommended: 0  Total # of Interventions Accepted: 0  Time Spent (min): 15

## 2025-03-06 ENCOUNTER — ANTI-COAG VISIT (OUTPATIENT)
Age: 75
End: 2025-03-06
Payer: MEDICARE

## 2025-03-06 VITALS
WEIGHT: 222.6 LBS | HEART RATE: 70 BPM | SYSTOLIC BLOOD PRESSURE: 150 MMHG | BODY MASS INDEX: 33.85 KG/M2 | DIASTOLIC BLOOD PRESSURE: 80 MMHG

## 2025-03-06 DIAGNOSIS — I48.0 PAROXYSMAL ATRIAL FIBRILLATION (HCC): Primary | ICD-10-CM

## 2025-03-06 LAB
INTERNATIONAL NORMALIZATION RATIO, POC: 2.1
PROTHROMBIN TIME, POC: 0

## 2025-03-06 PROCEDURE — 99211 OFF/OP EST MAY X REQ PHY/QHP: CPT

## 2025-03-06 PROCEDURE — 85610 PROTHROMBIN TIME: CPT

## 2025-03-06 NOTE — PROGRESS NOTES
[facility] Anticoagulation Clinic (Medication Management)  [address] [place], [address]  Phone: [phone]    Face-To-Face Visit  Patient Findings       Negatives:  Signs/symptoms of thrombosis, Signs/symptoms of bleeding, Laboratory test error suspected, Change in health, Change in alcohol use, Change in activity, Upcoming invasive procedure, Emergency department visit, Upcoming dental procedure, Missed doses, Extra doses, Change in medications, Change in diet/appetite, Hospital admission, Bruising, Other complaints           Assessment/Plan:  Warfarin indication: atrial fibrillation      INR today is therapeutic at 2.1, goal is 2-3.    Warfarin Dose: same (7.5 mg every Monday; 5 mg all other days)    Follow Up:  Recheck INR in 6 week(s).      Sharona Stinson [facility] PharmD, BCACP 3/6/2025 11:34 AM    For Pharmacy Admin Tracking Only    Intervention Detail:   Total # of Interventions Recommended: 0  Total # of Interventions Accepted: 0  Time Spent (min): 15

## 2025-04-03 PROCEDURE — 93296 REM INTERROG EVL PM/IDS: CPT | Performed by: INTERNAL MEDICINE

## 2025-04-03 PROCEDURE — 93294 REM INTERROG EVL PM/LDLS PM: CPT | Performed by: INTERNAL MEDICINE

## 2025-04-22 RX ORDER — METOPROLOL SUCCINATE 50 MG/1
50 TABLET, EXTENDED RELEASE ORAL DAILY
Qty: 90 TABLET | Refills: 3 | Status: SHIPPED | OUTPATIENT
Start: 2025-04-22

## 2025-04-24 ENCOUNTER — ANTI-COAG VISIT (OUTPATIENT)
Age: 75
End: 2025-04-24
Payer: MEDICARE

## 2025-04-24 VITALS
BODY MASS INDEX: 33.6 KG/M2 | WEIGHT: 221 LBS | DIASTOLIC BLOOD PRESSURE: 82 MMHG | HEART RATE: 82 BPM | SYSTOLIC BLOOD PRESSURE: 146 MMHG

## 2025-04-24 DIAGNOSIS — I48.0 PAROXYSMAL ATRIAL FIBRILLATION (HCC): Primary | ICD-10-CM

## 2025-04-24 LAB
INTERNATIONAL NORMALIZATION RATIO, POC: 2.5
PROTHROMBIN TIME, POC: 0

## 2025-04-24 PROCEDURE — 85610 PROTHROMBIN TIME: CPT

## 2025-04-24 PROCEDURE — 99211 OFF/OP EST MAY X REQ PHY/QHP: CPT

## 2025-04-24 NOTE — PROGRESS NOTES
[facility] Anticoagulation Clinic (Medication Management)  [address] [place], [place], [address]  Phone: [phone]    Face-To-Face Visit  Patient Findings       Positives:  Upcoming dental procedure (is having work done on his teeth- will double check on holding warfarin.)    Negatives:  Signs/symptoms of thrombosis, Signs/symptoms of bleeding, Laboratory test error suspected, Change in health, Change in alcohol use, Change in activity, Upcoming invasive procedure, Emergency department visit, Missed doses, Extra doses, Change in medications, Change in diet/appetite, Hospital admission, Bruising, Other complaints           Assessment/Plan:  Warfarin indication: atrial fibrillation       INR today is therapeutic at 2.1, goal is 2-3.     Warfarin Dose: same (7.5 mg every Monday; 5 mg all other days)     Follow Up:  Recheck INR in 6 week(s).      Electronically signed by Jennifer Young RPH, PharmD, BCPS 4/24/2025 3:20 PM       For Pharmacy Admin Tracking Only    Intervention Detail:   Total # of Interventions Recommended: 0  Total # of Interventions Accepted: 0  Time Spent (min): 15

## 2025-05-06 DIAGNOSIS — I48.0 PAF (PAROXYSMAL ATRIAL FIBRILLATION) (HCC): Primary | ICD-10-CM

## 2025-06-05 ENCOUNTER — ANTI-COAG VISIT (OUTPATIENT)
Age: 75
End: 2025-06-05
Payer: MEDICARE

## 2025-06-05 VITALS
HEART RATE: 69 BPM | SYSTOLIC BLOOD PRESSURE: 140 MMHG | DIASTOLIC BLOOD PRESSURE: 79 MMHG | BODY MASS INDEX: 33.09 KG/M2 | WEIGHT: 217.6 LBS

## 2025-06-05 DIAGNOSIS — I48.0 PAROXYSMAL ATRIAL FIBRILLATION (HCC): Primary | ICD-10-CM

## 2025-06-05 LAB
INTERNATIONAL NORMALIZATION RATIO, POC: 2.4
PROTHROMBIN TIME, POC: 0

## 2025-06-05 PROCEDURE — 99211 OFF/OP EST MAY X REQ PHY/QHP: CPT

## 2025-06-05 PROCEDURE — 85610 PROTHROMBIN TIME: CPT

## 2025-06-05 NOTE — PROGRESS NOTES
[facility] Anticoagulation Clinic (Medication Management)  [address] [place], [address]  Phone: [phone]    Face-To-Face Visit  Patient Findings       Negatives:  Signs/symptoms of thrombosis, Signs/symptoms of bleeding, Laboratory test error suspected, Change in health, Change in alcohol use, Change in activity, Upcoming invasive procedure, Emergency department visit, Upcoming dental procedure, Missed doses, Extra doses, Change in medications, Change in diet/appetite, Hospital admission, Bruising, Other complaints           Assessment/Plan:  Warfarin indication: atrial fibrillation      INR today is therapeutic at 2.4, goal is 2-3.    Warfarin Dose: same (7.5 mg every Monday; 5 mg all other days)    Follow Up:  Recheck INR in 6 week(s).      Sharona Stinson [facility] PharmD, BCACP 6/5/2025 3:07 PM    For Pharmacy Admin Tracking Only    Intervention Detail:   Total # of Interventions Recommended: 0  Total # of Interventions Accepted: 0  Time Spent (min): 15

## 2025-06-13 NOTE — PROGRESS NOTES
[facility]  Outpatient Progress Note  Lamine Foley Sr  1950  Date of Service: 6/17/2025  PCP: Danny Remy DO  Electrophysiologist: George Carias MD        Subjective: Lamine Foley Sr is seen for follow-up and management of pacemaker in situ. He has a history of PAF, CHB, pacemaker in situ, HTN, RBBB, GERD, OA and lymphoma.  He originally had ILR insertion on 11/22/21.  He was found to have symptomatic intermittent complete AV block on 11/29/22 with asystole of 10 seconds in December 2022. He underwent dual chamber pacemaker implantation on 1/4/23.  He was last seen in October 2024.  Since his last office visit the patient reports feeling overall well and offers no complaints from a device POV. The device site looks well healed and free from infection or erosion. The patient denies any chest pain, dyspnea, palpitations, dizziness, syncope, orthopnea or paroxysmal nocturnal dyspnea. The patient continues to be followed remotely. He continues on Flecainide, Metoprolol and Warfarin. He presents today in SR with stable QRS.    Patient Active Problem List   Diagnosis    Chest pain    RBBB (right bundle branch block)    HTN (hypertension)    OA (osteoarthritis)    GERD (gastroesophageal reflux disease)    Valvular heart disease    Palpitations    Paroxysmal atrial fibrillation (HCC)    Lymphoma (HCC)    HB (heart block)    Complete AV block (HCC)    Presence of cardiac pacemaker    AVB (atrioventricular block)       Current Outpatient Medications   Medication Sig Dispense Refill    metoprolol succinate (TOPROL XL) 50 MG extended release tablet Take 1 tablet by mouth daily 90 tablet 3    amLODIPine (NORVASC) 5 MG tablet Take 1 tablet by mouth daily      Omega-3 Fatty Acids (FISH OIL) 1000 MG capsule Take 3 capsules by mouth daily      warfarin (COUMADIN) 5 MG tablet Take 1 or 1.5 tablets by mouth once daily in the evening as

## 2025-06-17 ENCOUNTER — OFFICE VISIT (OUTPATIENT)
Dept: NON INVASIVE DIAGNOSTICS | Age: 75
End: 2025-06-17

## 2025-06-17 VITALS
BODY MASS INDEX: 32.58 KG/M2 | RESPIRATION RATE: 16 BRPM | WEIGHT: 215 LBS | SYSTOLIC BLOOD PRESSURE: 136 MMHG | TEMPERATURE: 98.6 F | OXYGEN SATURATION: 97 % | HEIGHT: 68 IN | DIASTOLIC BLOOD PRESSURE: 70 MMHG | HEART RATE: 63 BPM

## 2025-06-17 DIAGNOSIS — R94.31 ABNORMAL ELECTROCARDIOGRAPHY: ICD-10-CM

## 2025-06-17 DIAGNOSIS — Z51.81 ENCOUNTER FOR MONITORING FLECAINIDE THERAPY: ICD-10-CM

## 2025-06-17 DIAGNOSIS — Z79.899 ENCOUNTER FOR MONITORING FLECAINIDE THERAPY: ICD-10-CM

## 2025-06-17 DIAGNOSIS — I48.0 PAF (PAROXYSMAL ATRIAL FIBRILLATION) (HCC): Primary | ICD-10-CM

## 2025-06-17 DIAGNOSIS — Z95.0 PACEMAKER: ICD-10-CM

## 2025-06-17 LAB
ALBUMIN: 4.1 G/DL (ref 3.5–5.2)
ALP BLD-CCNC: 69 U/L (ref 40–129)
ALT SERPL-CCNC: 8 U/L (ref 0–50)
ANION GAP SERPL CALCULATED.3IONS-SCNC: 15 MMOL/L (ref 7–16)
AST SERPL-CCNC: 31 U/L (ref 0–50)
BILIRUB SERPL-MCNC: 0.6 MG/DL (ref 0–1.2)
BUN BLDV-MCNC: 18 MG/DL (ref 8–23)
CALCIUM SERPL-MCNC: 9.4 MG/DL (ref 8.8–10.2)
CHLORIDE BLD-SCNC: 104 MMOL/L (ref 98–107)
CO2: 20 MMOL/L (ref 22–29)
CREAT SERPL-MCNC: 0.9 MG/DL (ref 0.7–1.2)
GFR, ESTIMATED: 88 ML/MIN/1.73M2
GLUCOSE BLD-MCNC: 246 MG/DL (ref 74–99)
HCT VFR BLD CALC: 38.9 % (ref 37–54)
HEMOGLOBIN: 13.3 G/DL (ref 12.5–16.5)
MCH RBC QN AUTO: 32 PG (ref 26–35)
MCHC RBC AUTO-ENTMCNC: 34.2 G/DL (ref 32–34.5)
MCV RBC AUTO: 93.7 FL (ref 80–99.9)
PDW BLD-RTO: 11.7 % (ref 11.5–15)
PLATELET # BLD: 319 K/UL (ref 130–450)
PMV BLD AUTO: 10.2 FL (ref 7–12)
POTASSIUM SERPL-SCNC: 4.5 MMOL/L (ref 3.5–5.1)
RBC # BLD: 4.15 M/UL (ref 3.8–5.8)
SODIUM BLD-SCNC: 139 MMOL/L (ref 136–145)
TOTAL PROTEIN: 7.7 G/DL (ref 6.4–8.3)
WBC # BLD: 8.4 K/UL (ref 4.5–11.5)

## 2025-06-17 RX ORDER — REGADENOSON 0.08 MG/ML
0.4 INJECTION, SOLUTION INTRAVENOUS
OUTPATIENT
Start: 2025-06-17

## 2025-06-27 ENCOUNTER — TRANSCRIBE ORDERS (OUTPATIENT)
Dept: ADMINISTRATIVE | Age: 75
End: 2025-06-27

## 2025-06-27 DIAGNOSIS — M47.817 LUMBOSACRAL SPONDYLOSIS WITHOUT MYELOPATHY: ICD-10-CM

## 2025-06-27 DIAGNOSIS — M54.17 RADICULOPATHY, LUMBOSACRAL REGION: Primary | ICD-10-CM

## 2025-06-27 DIAGNOSIS — M47.816 LUMBAR SPONDYLOSIS: ICD-10-CM

## 2025-06-27 DIAGNOSIS — M51.379 DEGENERATION OF INTERVERTEBRAL DISC OF LUMBOSACRAL REGION, UNSPECIFIED WHETHER PAIN PRESENT: ICD-10-CM

## 2025-07-03 PROCEDURE — 93296 REM INTERROG EVL PM/IDS: CPT | Performed by: INTERNAL MEDICINE

## 2025-07-03 PROCEDURE — 93294 REM INTERROG EVL PM/LDLS PM: CPT | Performed by: INTERNAL MEDICINE

## 2025-07-08 ENCOUNTER — HOSPITAL ENCOUNTER (OUTPATIENT)
Dept: CARDIOLOGY | Age: 75
Discharge: HOME | End: 2025-07-10
Payer: MEDICARE

## 2025-07-08 VITALS
HEART RATE: 66 BPM | SYSTOLIC BLOOD PRESSURE: 144 MMHG | WEIGHT: 215 LBS | DIASTOLIC BLOOD PRESSURE: 88 MMHG | RESPIRATION RATE: 16 BRPM | HEIGHT: 68 IN | BODY MASS INDEX: 32.58 KG/M2

## 2025-07-08 DIAGNOSIS — R94.31 ABNORMAL ELECTROCARDIOGRAPHY: Primary | ICD-10-CM

## 2025-07-08 LAB
ECHO BSA: 2.16 M2
NUC STRESS EJECTION FRACTION: 48 %
STRESS BASELINE DIAS BP: 88 MMHG
STRESS BASELINE HR: 66 BPM
STRESS BASELINE SYS BP: 144 MMHG
STRESS ESTIMATED WORKLOAD: 1 METS
STRESS PEAK DIAS BP: 88 MMHG
STRESS PEAK SYS BP: 144 MMHG
STRESS PERCENT HR ACHIEVED: 48 %
STRESS POST PEAK HR: 71 BPM
STRESS RATE PRESSURE PRODUCT: NORMAL BPM*MMHG
STRESS TARGET HR: 147 BPM

## 2025-07-08 PROCEDURE — 93018 CV STRESS TEST I&R ONLY: CPT | Performed by: INTERNAL MEDICINE

## 2025-07-08 PROCEDURE — 6360000002 HC RX W HCPCS: Performed by: INTERNAL MEDICINE

## 2025-07-08 PROCEDURE — A9500 TC99M SESTAMIBI: HCPCS | Performed by: INTERNAL MEDICINE

## 2025-07-08 PROCEDURE — 78452 HT MUSCLE IMAGE SPECT MULT: CPT | Performed by: INTERNAL MEDICINE

## 2025-07-08 PROCEDURE — 3430000000 HC RX DIAGNOSTIC RADIOPHARMACEUTICAL: Performed by: INTERNAL MEDICINE

## 2025-07-08 PROCEDURE — 93016 CV STRESS TEST SUPVJ ONLY: CPT | Performed by: INTERNAL MEDICINE

## 2025-07-08 PROCEDURE — 93017 CV STRESS TEST TRACING ONLY: CPT

## 2025-07-08 PROCEDURE — 2500000003 HC RX 250 WO HCPCS: Performed by: INTERNAL MEDICINE

## 2025-07-08 RX ORDER — TETRAKIS(2-METHOXYISOBUTYLISOCYANIDE)COPPER(I) TETRAFLUOROBORATE 1 MG/ML
32.5 INJECTION, POWDER, LYOPHILIZED, FOR SOLUTION INTRAVENOUS
Status: COMPLETED | OUTPATIENT
Start: 2025-07-08 | End: 2025-07-08

## 2025-07-08 RX ORDER — REGADENOSON 0.08 MG/ML
0.4 INJECTION, SOLUTION INTRAVENOUS
Status: COMPLETED | OUTPATIENT
Start: 2025-07-08 | End: 2025-07-08

## 2025-07-08 RX ORDER — SODIUM CHLORIDE 0.9 % (FLUSH) 0.9 %
10 SYRINGE (ML) INJECTION PRN
Status: DISCONTINUED | OUTPATIENT
Start: 2025-07-08 | End: 2025-07-11 | Stop reason: HOSPADM

## 2025-07-08 RX ORDER — TETRAKIS(2-METHOXYISOBUTYLISOCYANIDE)COPPER(I) TETRAFLUOROBORATE 1 MG/ML
9.8 INJECTION, POWDER, LYOPHILIZED, FOR SOLUTION INTRAVENOUS
Status: COMPLETED | OUTPATIENT
Start: 2025-07-08 | End: 2025-07-08

## 2025-07-08 RX ADMIN — Medication 9.8 MILLICURIE: at 09:38

## 2025-07-08 RX ADMIN — REGADENOSON 0.4 MG: 0.08 INJECTION, SOLUTION INTRAVENOUS at 11:26

## 2025-07-08 RX ADMIN — SODIUM CHLORIDE, PRESERVATIVE FREE 10 ML: 5 INJECTION INTRAVENOUS at 09:38

## 2025-07-08 RX ADMIN — SODIUM CHLORIDE, PRESERVATIVE FREE 10 ML: 5 INJECTION INTRAVENOUS at 11:26

## 2025-07-08 RX ADMIN — SODIUM CHLORIDE, PRESERVATIVE FREE 10 ML: 5 INJECTION INTRAVENOUS at 11:27

## 2025-07-08 RX ADMIN — Medication 32.5 MILLICURIE: at 11:27

## 2025-07-15 ENCOUNTER — RESULTS FOLLOW-UP (OUTPATIENT)
Dept: NON INVASIVE DIAGNOSTICS | Age: 75
End: 2025-07-15

## 2025-07-15 DIAGNOSIS — R94.39 ABNORMAL CARDIOVASCULAR STRESS TEST: ICD-10-CM

## 2025-07-15 DIAGNOSIS — R94.31 ABNORMAL EKG: Primary | ICD-10-CM

## 2025-07-15 NOTE — RESULT ENCOUNTER NOTE
LM to call office for results.    Electronically signed by Samreen Wilson MA on 7/15/2025 at 9:22 AM

## 2025-07-16 NOTE — TELEPHONE ENCOUNTER
Pt notified of results and verbalized understanding.    Patient agreed to stop flecainide as of today.   The patient agreed to have ECHO done and was transferred to ECHO dept to schedule. Order placed.   Office visit was scheduled with Dr. Carias per patient request to discuss AF management options.   Referral was placed to [facility].     Electronically signed by Samreen Wilson MA on 7/16/2025 at 10:30 AM

## 2025-07-16 NOTE — TELEPHONE ENCOUNTER
----- Message from MAYNOR Jamison CNP sent at 7/14/2025 10:34 AM EDT -----  Please let the patient know that the stress test is abnormal with an area of abnormal perfusion and a depressed LVEF she will need to 1. Stop Flecainide 2. Obtain TTE and 3. Establish with cardiology she will need a follow up  to discuss AF management ongoing monitoring, alternative AAD (tikosyn) and or AF ablation +/- device upgrade given RV pacing >40% and depressed LVEF.  Thanks.  ----- Message -----  From: Davon Browne MD  Sent: 7/8/2025   1:46 PM EDT  To: George Carias MD

## 2025-07-17 ENCOUNTER — ANTI-COAG VISIT (OUTPATIENT)
Age: 75
End: 2025-07-17
Payer: MEDICARE

## 2025-07-17 VITALS
BODY MASS INDEX: 33.12 KG/M2 | DIASTOLIC BLOOD PRESSURE: 73 MMHG | SYSTOLIC BLOOD PRESSURE: 147 MMHG | HEART RATE: 62 BPM | WEIGHT: 217.8 LBS

## 2025-07-17 DIAGNOSIS — I48.0 PAROXYSMAL ATRIAL FIBRILLATION (HCC): Primary | ICD-10-CM

## 2025-07-17 LAB
INTERNATIONAL NORMALIZATION RATIO, POC: 2.5
PROTHROMBIN TIME, POC: 0

## 2025-07-17 PROCEDURE — 99211 OFF/OP EST MAY X REQ PHY/QHP: CPT

## 2025-07-17 PROCEDURE — 85610 PROTHROMBIN TIME: CPT

## 2025-07-17 RX ORDER — GABAPENTIN 100 MG/1
200 CAPSULE ORAL
COMMUNITY
Start: 2025-06-26

## 2025-07-17 NOTE — PROGRESS NOTES
[facility] (Medication Management)  [address] [place], [address]  Phone: [phone]    Face-To-Face Visit  Patient Findings       Positives:  Change in health (Heart echo next week), Change in medications (His flecainide was d/c'd; he is now on gabapentin 200 mg qhs)    Negatives:  Signs/symptoms of thrombosis, Signs/symptoms of bleeding, Laboratory test error suspected, Change in alcohol use, Change in activity, Upcoming invasive procedure, Emergency department visit, Upcoming dental procedure, Missed doses, Extra doses, Change in diet/appetite, Hospital admission, Bruising, Other complaints    Comments:  Dr. Sosa end of July           Assessment/Plan:  Warfarin indication: atrial fibrillation       INR today is therapeutic at 2.5, goal is 2-3.     Warfarin Dose: same (7.5 mg every Monday; 5 mg all other days)     Follow Up:  Recheck INR in 6 week(s).    Sharona Stinson [facility] PharmD, BCACP 7/17/2025 3:13 PM    For Pharmacy Admin Tracking Only    Intervention Detail:   Total # of Interventions Recommended: 0  Total # of Interventions Accepted: 0  Time Spent (min): 15

## 2025-07-22 ENCOUNTER — HOSPITAL ENCOUNTER (OUTPATIENT)
Dept: CARDIOLOGY | Age: 75
Discharge: HOME | End: 2025-07-24
Attending: INTERNAL MEDICINE
Payer: MEDICARE

## 2025-07-22 VITALS
WEIGHT: 217 LBS | SYSTOLIC BLOOD PRESSURE: 147 MMHG | HEIGHT: 68 IN | DIASTOLIC BLOOD PRESSURE: 73 MMHG | BODY MASS INDEX: 32.89 KG/M2

## 2025-07-22 DIAGNOSIS — R94.31 ABNORMAL EKG: ICD-10-CM

## 2025-07-22 PROCEDURE — 93306 TTE W/DOPPLER COMPLETE: CPT

## 2025-07-23 LAB
ECHO AO ASC DIAM: 2.7 CM
ECHO AO ASCENDING AORTA INDEX: 1.27 CM/M2
ECHO AV AREA PEAK VELOCITY: 2 CM2
ECHO AV AREA VTI: 2.5 CM2
ECHO AV AREA/BSA PEAK VELOCITY: 0.9 CM2/M2
ECHO AV AREA/BSA VTI: 1.2 CM2/M2
ECHO AV CUSP MM: 2 CM
ECHO AV MEAN GRADIENT: 4 MMHG
ECHO AV MEAN VELOCITY: 1 M/S
ECHO AV PEAK GRADIENT: 9 MMHG
ECHO AV PEAK VELOCITY: 1.5 M/S
ECHO AV VELOCITY RATIO: 0.6
ECHO AV VTI: 24.7 CM
ECHO BSA: 2.17 M2
ECHO EST RA PRESSURE: 3 MMHG
ECHO LA DIAMETER INDEX: 1.93 CM/M2
ECHO LA DIAMETER: 4.1 CM
ECHO LA VOL A-L A2C: 48 ML (ref 18–58)
ECHO LA VOL A-L A4C: 37 ML (ref 18–58)
ECHO LA VOL MOD A2C: 46 ML (ref 18–58)
ECHO LA VOL MOD A4C: 36 ML (ref 18–58)
ECHO LA VOLUME AREA LENGTH: 43 ML
ECHO LA VOLUME INDEX A-L A2C: 23 ML/M2 (ref 16–34)
ECHO LA VOLUME INDEX A-L A4C: 17 ML/M2 (ref 16–34)
ECHO LA VOLUME INDEX AREA LENGTH: 20 ML/M2 (ref 16–34)
ECHO LA VOLUME INDEX MOD A2C: 22 ML/M2 (ref 16–34)
ECHO LA VOLUME INDEX MOD A4C: 17 ML/M2 (ref 16–34)
ECHO LV EF PHYSICIAN: 50 %
ECHO LV EJECTION FRACTION A2C: 47 %
ECHO LV EJECTION FRACTION A4C: 44 %
ECHO LV FRACTIONAL SHORTENING: 26 % (ref 28–44)
ECHO LV INTERNAL DIMENSION DIASTOLE INDEX: 2.74 CM/M2
ECHO LV INTERNAL DIMENSION DIASTOLIC: 5.8 CM (ref 4.2–5.9)
ECHO LV INTERNAL DIMENSION SYSTOLIC INDEX: 2.03 CM/M2
ECHO LV INTERNAL DIMENSION SYSTOLIC: 4.3 CM
ECHO LV ISOVOLUMETRIC RELAXATION TIME (IVRT): 117.7 MS
ECHO LV IVSD: 1.5 CM (ref 0.6–1)
ECHO LV IVSS: 1.7 CM
ECHO LV MASS 2D: 386.1 G (ref 88–224)
ECHO LV MASS INDEX 2D: 182.1 G/M2 (ref 49–115)
ECHO LV POSTERIOR WALL DIASTOLIC: 1.4 CM (ref 0.6–1)
ECHO LV POSTERIOR WALL SYSTOLIC: 1.8 CM
ECHO LV RELATIVE WALL THICKNESS RATIO: 0.48
ECHO LVOT AREA: 3.5 CM2
ECHO LVOT AV VTI INDEX: 0.74
ECHO LVOT DIAM: 2.1 CM
ECHO LVOT MEAN GRADIENT: 2 MMHG
ECHO LVOT PEAK GRADIENT: 3 MMHG
ECHO LVOT PEAK VELOCITY: 0.9 M/S
ECHO LVOT STROKE VOLUME INDEX: 29.9 ML/M2
ECHO LVOT SV: 63.4 ML
ECHO LVOT VTI: 18.3 CM
ECHO MV "A" WAVE DURATION: 143 MSEC
ECHO MV A VELOCITY: 0.8 M/S
ECHO MV AREA PHT: 2.7 CM2
ECHO MV AREA VTI: 3.2 CM2
ECHO MV E DECELERATION TIME (DT): 220.6 MS
ECHO MV E VELOCITY: 0.65 M/S
ECHO MV E/A RATIO: 0.81
ECHO MV LVOT VTI INDEX: 1.08
ECHO MV MAX VELOCITY: 0.8 M/S
ECHO MV MEAN GRADIENT: 1 MMHG
ECHO MV MEAN VELOCITY: 0.5 M/S
ECHO MV PEAK GRADIENT: 3 MMHG
ECHO MV PRESSURE HALF TIME (PHT): 82.8 MS
ECHO MV VTI: 19.7 CM
ECHO PV MAX VELOCITY: 1 M/S
ECHO PV MEAN GRADIENT: 2 MMHG
ECHO PV MEAN VELOCITY: 0.7 M/S
ECHO PV PEAK GRADIENT: 4 MMHG
ECHO PV VTI: 19.1 CM
ECHO PVEIN PEAK D VELOCITY: 0.4 M/S
ECHO PVEIN PEAK S VELOCITY: 0.6 M/S
ECHO PVEIN S/D RATIO: 1.5
ECHO RIGHT VENTRICULAR SYSTOLIC PRESSURE (RVSP): 28 MMHG
ECHO RV INTERNAL DIMENSION: 3.5 CM
ECHO TV REGURGITANT MAX VELOCITY: 2.51 M/S
ECHO TV REGURGITANT PEAK GRADIENT: 25 MMHG

## 2025-07-23 PROCEDURE — 93306 TTE W/DOPPLER COMPLETE: CPT | Performed by: INTERNAL MEDICINE

## 2025-07-24 ENCOUNTER — HOSPITAL ENCOUNTER (OUTPATIENT)
Dept: MRI IMAGING | Age: 75
Discharge: HOME | End: 2025-07-26
Payer: MEDICARE

## 2025-07-24 ENCOUNTER — RESULTS FOLLOW-UP (OUTPATIENT)
Dept: NON INVASIVE DIAGNOSTICS | Age: 75
End: 2025-07-24

## 2025-07-24 DIAGNOSIS — M51.379 DEGENERATION OF INTERVERTEBRAL DISC OF LUMBOSACRAL REGION, UNSPECIFIED WHETHER PAIN PRESENT: ICD-10-CM

## 2025-07-24 DIAGNOSIS — M47.817 LUMBOSACRAL SPONDYLOSIS WITHOUT MYELOPATHY: ICD-10-CM

## 2025-07-24 DIAGNOSIS — M54.17 RADICULOPATHY, LUMBOSACRAL REGION: ICD-10-CM

## 2025-07-24 DIAGNOSIS — M47.816 LUMBAR SPONDYLOSIS: ICD-10-CM

## 2025-07-24 PROCEDURE — 72148 MRI LUMBAR SPINE W/O DYE: CPT

## 2025-07-24 NOTE — TELEPHONE ENCOUNTER
Pt notified of results and verbalized understanding.    Electronically signed by Samreen Wilson MA on 7/24/2025 at 2:35 PM

## 2025-07-24 NOTE — TELEPHONE ENCOUNTER
----- Message from MAYNOR Jamison - CNP sent at 7/24/2025  7:59 AM EDT -----  Please let the patient know the LVEF on the echo is better than the stress test at 50% this is a better assessment of LVEF he should remain off Flecainide and follow up with cardiology for suspected CAD. Thanks.  ----- Message -----  From: Larry Perez MD  Sent: 7/23/2025   5:42 PM EDT  To: George Carias MD

## 2025-08-28 ENCOUNTER — ANTI-COAG VISIT (OUTPATIENT)
Age: 75
End: 2025-08-28
Payer: MEDICARE

## 2025-08-28 ENCOUNTER — OFFICE VISIT (OUTPATIENT)
Dept: CARDIOLOGY CLINIC | Age: 75
End: 2025-08-28
Payer: MEDICARE

## 2025-08-28 VITALS
BODY MASS INDEX: 31.67 KG/M2 | RESPIRATION RATE: 18 BRPM | HEIGHT: 69 IN | TEMPERATURE: 97.3 F | WEIGHT: 213.8 LBS | OXYGEN SATURATION: 96 % | SYSTOLIC BLOOD PRESSURE: 138 MMHG | HEART RATE: 69 BPM | DIASTOLIC BLOOD PRESSURE: 72 MMHG

## 2025-08-28 VITALS — WEIGHT: 216.8 LBS | BODY MASS INDEX: 32.02 KG/M2

## 2025-08-28 DIAGNOSIS — I48.0 PAROXYSMAL ATRIAL FIBRILLATION (HCC): Primary | ICD-10-CM

## 2025-08-28 DIAGNOSIS — R94.39 ABNORMAL NUCLEAR STRESS TEST: Primary | ICD-10-CM

## 2025-08-28 DIAGNOSIS — R94.31 ABNORMAL ELECTROCARDIOGRAPHY: ICD-10-CM

## 2025-08-28 DIAGNOSIS — Z95.0 PRESENCE OF CARDIAC PACEMAKER: ICD-10-CM

## 2025-08-28 DIAGNOSIS — I48.0 PAROXYSMAL ATRIAL FIBRILLATION (HCC): ICD-10-CM

## 2025-08-28 LAB
INTERNATIONAL NORMALIZATION RATIO, POC: 2
PROTHROMBIN TIME, POC: NORMAL

## 2025-08-28 PROCEDURE — 1036F TOBACCO NON-USER: CPT | Performed by: INTERNAL MEDICINE

## 2025-08-28 PROCEDURE — 1123F ACP DISCUSS/DSCN MKR DOCD: CPT | Performed by: INTERNAL MEDICINE

## 2025-08-28 PROCEDURE — 99214 OFFICE O/P EST MOD 30 MIN: CPT | Performed by: INTERNAL MEDICINE

## 2025-08-28 PROCEDURE — 85610 PROTHROMBIN TIME: CPT

## 2025-08-28 PROCEDURE — G8427 DOCREV CUR MEDS BY ELIG CLIN: HCPCS | Performed by: INTERNAL MEDICINE

## 2025-08-28 PROCEDURE — G8417 CALC BMI ABV UP PARAM F/U: HCPCS | Performed by: INTERNAL MEDICINE

## 2025-08-28 PROCEDURE — 93000 ELECTROCARDIOGRAM COMPLETE: CPT | Performed by: INTERNAL MEDICINE

## 2025-08-28 PROCEDURE — 99211 OFF/OP EST MAY X REQ PHY/QHP: CPT

## 2025-08-28 PROCEDURE — 3017F COLORECTAL CA SCREEN DOC REV: CPT | Performed by: INTERNAL MEDICINE

## 2025-08-28 PROCEDURE — 3075F SYST BP GE 130 - 139MM HG: CPT | Performed by: INTERNAL MEDICINE

## 2025-08-28 PROCEDURE — 3078F DIAST BP <80 MM HG: CPT | Performed by: INTERNAL MEDICINE

## 2025-08-28 PROCEDURE — 1160F RVW MEDS BY RX/DR IN RCRD: CPT | Performed by: INTERNAL MEDICINE

## 2025-08-28 PROCEDURE — 1159F MED LIST DOCD IN RCRD: CPT | Performed by: INTERNAL MEDICINE

## 2025-09-03 DIAGNOSIS — R07.9 CHEST PAIN, UNSPECIFIED TYPE: Primary | ICD-10-CM
